# Patient Record
Sex: MALE | Race: WHITE | NOT HISPANIC OR LATINO | Employment: OTHER | URBAN - NONMETROPOLITAN AREA
[De-identification: names, ages, dates, MRNs, and addresses within clinical notes are randomized per-mention and may not be internally consistent; named-entity substitution may affect disease eponyms.]

---

## 2023-03-06 ENCOUNTER — TELEPHONE (OUTPATIENT)
Dept: PRIMARY CARE | Facility: CLINIC | Age: 73
End: 2023-03-06
Payer: COMMERCIAL

## 2023-03-06 DIAGNOSIS — J32.1 SINUSITIS CHRONIC, FRONTAL: ICD-10-CM

## 2023-03-06 RX ORDER — AZITHROMYCIN 500 MG/1
500 TABLET, FILM COATED ORAL DAILY
Qty: 5 TABLET | Refills: 0 | Status: SHIPPED | OUTPATIENT
Start: 2023-03-06 | End: 2023-03-11

## 2023-04-03 ENCOUNTER — TELEPHONE (OUTPATIENT)
Dept: PRIMARY CARE | Facility: CLINIC | Age: 73
End: 2023-04-03
Payer: COMMERCIAL

## 2023-04-03 DIAGNOSIS — U07.1 COVID-19: Primary | ICD-10-CM

## 2023-04-03 RX ORDER — AMOXICILLIN 875 MG/1
875 TABLET, FILM COATED ORAL 2 TIMES DAILY
Qty: 20 TABLET | Refills: 0 | Status: SHIPPED | OUTPATIENT
Start: 2023-04-03 | End: 2023-04-03 | Stop reason: SINTOL

## 2023-04-03 RX ORDER — PROPRANOLOL HYDROCHLORIDE 20 MG/1
1 TABLET ORAL 2 TIMES DAILY
COMMUNITY
Start: 2020-03-03 | End: 2023-08-23 | Stop reason: SDUPTHER

## 2023-04-03 RX ORDER — CALCIUM CARBONATE 500(1250)
1 TABLET ORAL DAILY
COMMUNITY
Start: 2020-03-03

## 2023-04-03 RX ORDER — AZITHROMYCIN 250 MG/1
TABLET, FILM COATED ORAL
Qty: 6 TABLET | Refills: 0 | Status: SHIPPED | OUTPATIENT
Start: 2023-04-03 | End: 2023-05-25 | Stop reason: ALTCHOICE

## 2023-04-03 RX ORDER — ACETAMINOPHEN 500 MG
50 TABLET ORAL DAILY
COMMUNITY
Start: 2020-03-03

## 2023-04-03 RX ORDER — ZINC GLUCONATE 50 MG
1 TABLET ORAL DAILY
COMMUNITY
Start: 2021-05-10 | End: 2023-12-15 | Stop reason: ALTCHOICE

## 2023-04-03 RX ORDER — PREDNISONE 20 MG/1
TABLET ORAL
Qty: 21 TABLET | Refills: 0 | Status: SHIPPED | OUTPATIENT
Start: 2023-04-03 | End: 2023-05-25 | Stop reason: ALTCHOICE

## 2023-04-03 RX ORDER — IBUPROFEN 100 MG/5ML
1 SUSPENSION, ORAL (FINAL DOSE FORM) ORAL DAILY
COMMUNITY
Start: 2020-03-03

## 2023-05-22 PROBLEM — N18.30 STAGE 3 CHRONIC KIDNEY DISEASE (MULTI): Status: ACTIVE | Noted: 2023-05-22

## 2023-05-22 PROBLEM — G25.2 INTENTION TREMOR: Status: ACTIVE | Noted: 2023-05-22

## 2023-05-22 PROBLEM — D63.8 ANEMIA OF CHRONIC DISEASE: Status: ACTIVE | Noted: 2023-05-22

## 2023-05-22 PROBLEM — R63.4 WEIGHT LOSS: Status: ACTIVE | Noted: 2023-05-22

## 2023-05-22 NOTE — PROGRESS NOTES
Subjective   Patient ID:   Kashif Jung is a 72 y.o. male who presents for Pre-op Exam.  HPI  Date of surgery: 6/13 and 6/20  Type of surgery: Cataracts bilateral  Surgeon performing: [facility]  Labs/tests: Nov 2022.  Previous stress test: 2019  Smoking status: Former smoker.  History of DVT/PE: No  Prior anesthesia: Tolerates well  Blood thinners: None  CP or SOB: Denies    Tinnitus/Insomnia:  Having troubles staying asleep at night.  Wondering if its due to his tinnitus.  Consider doing hearing tests.    Headache:  He hit his head on a shower bar about 4-5 weeks ago.  Having off and on headaches, but they aren't usually in the same spot.  Not happening daily.  Has migraine history.    Weight loss:  He did change his diet in Jan 2020 and cut out fast food.  Eating more salads now.  States a dietician said this can be normal due to his diet changes.  Last had colonoscopy in 2019.    Fatigue:  Has had this since COVID.  No recent fatigue workup.    Intention tremors:  Taking Propranolol.   These are stable.    CKD stage III:  Seeing nephrology.  Creatinine was 1.47 in Nov 2022    Anemia of chronic disease:  Last CBC was Nov 2022 and was stable.    Health maintenance:  Smoking: Former smoker.  PSA (50+): Nov 2022  Labs: Nov 2022  Colonoscopy (50-75): 2019.  Influenza:     Review of Systems  12 point review of systems negative unless stated above in HPI    Vitals:    05/25/23 1335   BP: 134/76   Pulse: 69   Resp: 18   SpO2: 93%       Physical Exam  General: Alert and oriented, well nourished, no acute distress.  Lungs: Clear to auscultation, non-labored respiration.  Heart: Normal rate, regular rhythm, no murmur, gallop or edema.  Neurologic: Awake, alert, and oriented X3, CN II-XII intact.  Psychiatric: Cooperative, appropriate mood and affect.    Assessment/Plan   Pt is medically cleared for surgery with low risk.  Paperwork filled out.  Good luck!  Consider doing hearing tests for the tinnitus.  Also  consider daily migraine medication/head imaging.  We can do fatigue labs next visit.  Continue the same medications.  Chronic conditions are stable.  Call with questions or concerns.    F/u  6 months  Diagnoses and all orders for this visit:  Anemia of chronic disease  Intention tremor  Stage 3 chronic kidney disease, unspecified whether stage 3a or 3b CKD (CMS/HCC)  Weight loss  Pre-op evaluation  Cataract of both eyes, unspecified cataract type  Tinnitus of both ears  Primary insomnia  Nonintractable episodic headache, unspecified headache type  Fatigue, unspecified type

## 2023-05-25 ENCOUNTER — OFFICE VISIT (OUTPATIENT)
Dept: PRIMARY CARE | Facility: CLINIC | Age: 73
End: 2023-05-25
Payer: MEDICARE

## 2023-05-25 VITALS
SYSTOLIC BLOOD PRESSURE: 134 MMHG | WEIGHT: 179.8 LBS | BODY MASS INDEX: 27.25 KG/M2 | RESPIRATION RATE: 18 BRPM | OXYGEN SATURATION: 93 % | HEIGHT: 68 IN | DIASTOLIC BLOOD PRESSURE: 76 MMHG | HEART RATE: 69 BPM

## 2023-05-25 DIAGNOSIS — D63.8 ANEMIA OF CHRONIC DISEASE: Primary | ICD-10-CM

## 2023-05-25 DIAGNOSIS — Z01.818 PRE-OP EVALUATION: ICD-10-CM

## 2023-05-25 DIAGNOSIS — G25.2 INTENTION TREMOR: ICD-10-CM

## 2023-05-25 DIAGNOSIS — R53.83 FATIGUE, UNSPECIFIED TYPE: ICD-10-CM

## 2023-05-25 DIAGNOSIS — N18.30 STAGE 3 CHRONIC KIDNEY DISEASE, UNSPECIFIED WHETHER STAGE 3A OR 3B CKD (MULTI): ICD-10-CM

## 2023-05-25 DIAGNOSIS — H26.9 CATARACT OF BOTH EYES, UNSPECIFIED CATARACT TYPE: ICD-10-CM

## 2023-05-25 DIAGNOSIS — R51.9 NONINTRACTABLE EPISODIC HEADACHE, UNSPECIFIED HEADACHE TYPE: ICD-10-CM

## 2023-05-25 DIAGNOSIS — F51.01 PRIMARY INSOMNIA: ICD-10-CM

## 2023-05-25 DIAGNOSIS — H93.13 TINNITUS OF BOTH EARS: ICD-10-CM

## 2023-05-25 DIAGNOSIS — R63.4 WEIGHT LOSS: ICD-10-CM

## 2023-05-25 PROBLEM — J30.9 ALLERGIC RHINITIS: Status: RESOLVED | Noted: 2023-05-25 | Resolved: 2023-05-25

## 2023-05-25 PROBLEM — M75.121 COMPLETE TEAR OF RIGHT ROTATOR CUFF: Status: RESOLVED | Noted: 2023-05-25 | Resolved: 2023-05-25

## 2023-05-25 PROBLEM — R79.89 ABNORMAL THYROID BLOOD TEST: Status: ACTIVE | Noted: 2023-05-25

## 2023-05-25 PROBLEM — H69.92 DYSFUNCTION OF LEFT EUSTACHIAN TUBE: Status: RESOLVED | Noted: 2023-05-25 | Resolved: 2023-05-25

## 2023-05-25 PROBLEM — G25.0 ESSENTIAL TREMOR: Status: ACTIVE | Noted: 2020-06-24

## 2023-05-25 PROBLEM — E78.00 ELEVATED LDL CHOLESTEROL LEVEL: Status: RESOLVED | Noted: 2023-05-25 | Resolved: 2023-05-25

## 2023-05-25 PROBLEM — N18.9 ANEMIA OF RENAL DISEASE: Status: ACTIVE | Noted: 2020-06-24

## 2023-05-25 PROBLEM — L29.0 PRURITUS ANI: Status: RESOLVED | Noted: 2023-05-25 | Resolved: 2023-05-25

## 2023-05-25 PROBLEM — R50.9 FEVER: Status: RESOLVED | Noted: 2023-05-25 | Resolved: 2023-05-25

## 2023-05-25 PROBLEM — I10 HYPERTENSION: Status: ACTIVE | Noted: 2020-06-24

## 2023-05-25 PROBLEM — M25.511 RIGHT SHOULDER PAIN: Status: RESOLVED | Noted: 2023-05-25 | Resolved: 2023-05-25

## 2023-05-25 PROBLEM — R05.9 COUGH: Status: RESOLVED | Noted: 2023-05-25 | Resolved: 2023-05-25

## 2023-05-25 PROBLEM — K63.5 SESSILE COLONIC POLYP: Status: ACTIVE | Noted: 2023-05-25

## 2023-05-25 PROBLEM — D63.1 ANEMIA OF RENAL DISEASE: Status: ACTIVE | Noted: 2020-06-24

## 2023-05-25 PROBLEM — J06.9 URI (UPPER RESPIRATORY INFECTION): Status: RESOLVED | Noted: 2023-05-25 | Resolved: 2023-05-25

## 2023-05-25 PROBLEM — F41.9 ANXIETY: Status: ACTIVE | Noted: 2023-05-25

## 2023-05-25 PROBLEM — K21.9 GASTROESOPHAGEAL REFLUX DISEASE: Status: RESOLVED | Noted: 2020-06-24 | Resolved: 2023-05-25

## 2023-05-25 PROBLEM — K21.9 GERD WITHOUT ESOPHAGITIS: Status: ACTIVE | Noted: 2023-05-25

## 2023-05-25 PROBLEM — N40.0 BENIGN PROSTATIC HYPERPLASIA WITHOUT LOWER URINARY TRACT SYMPTOMS: Status: ACTIVE | Noted: 2023-05-25

## 2023-05-25 PROBLEM — M75.41 IMPINGEMENT SYNDROME OF SHOULDER, RIGHT: Status: RESOLVED | Noted: 2023-05-25 | Resolved: 2023-05-25

## 2023-05-25 PROBLEM — D64.9 LOW HEMOGLOBIN: Status: ACTIVE | Noted: 2023-05-25

## 2023-05-25 PROBLEM — J20.9 ACUTE BRONCHITIS: Status: RESOLVED | Noted: 2023-05-25 | Resolved: 2023-05-25

## 2023-05-25 PROCEDURE — 3075F SYST BP GE 130 - 139MM HG: CPT | Performed by: PHYSICIAN ASSISTANT

## 2023-05-25 PROCEDURE — 1160F RVW MEDS BY RX/DR IN RCRD: CPT | Performed by: PHYSICIAN ASSISTANT

## 2023-05-25 PROCEDURE — 99214 OFFICE O/P EST MOD 30 MIN: CPT | Performed by: PHYSICIAN ASSISTANT

## 2023-05-25 PROCEDURE — 1159F MED LIST DOCD IN RCRD: CPT | Performed by: PHYSICIAN ASSISTANT

## 2023-05-25 PROCEDURE — 3078F DIAST BP <80 MM HG: CPT | Performed by: PHYSICIAN ASSISTANT

## 2023-05-25 PROCEDURE — 1036F TOBACCO NON-USER: CPT | Performed by: PHYSICIAN ASSISTANT

## 2023-05-25 ASSESSMENT — PATIENT HEALTH QUESTIONNAIRE - PHQ9
2. FEELING DOWN, DEPRESSED OR HOPELESS: NOT AT ALL
SUM OF ALL RESPONSES TO PHQ9 QUESTIONS 1 AND 2: 0
1. LITTLE INTEREST OR PLEASURE IN DOING THINGS: NOT AT ALL

## 2023-05-25 ASSESSMENT — PAIN SCALES - GENERAL: PAINLEVEL: 0-NO PAIN

## 2023-08-23 DIAGNOSIS — I10 HYPERTENSION, UNSPECIFIED TYPE: ICD-10-CM

## 2023-08-23 RX ORDER — PROPRANOLOL HYDROCHLORIDE 20 MG/1
20 TABLET ORAL 2 TIMES DAILY
Qty: 180 TABLET | Refills: 0 | Status: SHIPPED | OUTPATIENT
Start: 2023-08-23 | End: 2023-11-14

## 2023-10-17 ENCOUNTER — TELEPHONE (OUTPATIENT)
Dept: PRIMARY CARE | Facility: CLINIC | Age: 73
End: 2023-10-17
Payer: COMMERCIAL

## 2023-10-20 DIAGNOSIS — I10 HYPERTENSION, UNSPECIFIED TYPE: ICD-10-CM

## 2023-10-20 DIAGNOSIS — D63.8 ANEMIA OF CHRONIC DISEASE: Primary | ICD-10-CM

## 2023-10-20 DIAGNOSIS — Z00.00 HEALTH MAINTENANCE EXAMINATION: ICD-10-CM

## 2023-10-20 DIAGNOSIS — R79.89 ABNORMAL THYROID BLOOD TEST: ICD-10-CM

## 2023-10-20 DIAGNOSIS — N18.30 STAGE 3 CHRONIC KIDNEY DISEASE, UNSPECIFIED WHETHER STAGE 3A OR 3B CKD (MULTI): ICD-10-CM

## 2023-10-20 DIAGNOSIS — Z12.5 PROSTATE CANCER SCREENING: ICD-10-CM

## 2023-10-20 DIAGNOSIS — I10 ESSENTIAL (PRIMARY) HYPERTENSION: ICD-10-CM

## 2023-11-14 DIAGNOSIS — I10 HYPERTENSION, UNSPECIFIED TYPE: ICD-10-CM

## 2023-11-14 RX ORDER — PROPRANOLOL HYDROCHLORIDE 20 MG/1
20 TABLET ORAL 2 TIMES DAILY
Qty: 180 TABLET | Refills: 0 | Status: SHIPPED | OUTPATIENT
Start: 2023-11-14 | End: 2023-11-16 | Stop reason: SDUPTHER

## 2023-11-16 ENCOUNTER — OFFICE VISIT (OUTPATIENT)
Dept: PRIMARY CARE | Facility: CLINIC | Age: 73
End: 2023-11-16
Payer: MEDICARE

## 2023-11-16 VITALS
HEIGHT: 68 IN | DIASTOLIC BLOOD PRESSURE: 87 MMHG | WEIGHT: 187 LBS | BODY MASS INDEX: 28.34 KG/M2 | HEART RATE: 55 BPM | RESPIRATION RATE: 16 BRPM | OXYGEN SATURATION: 97 % | SYSTOLIC BLOOD PRESSURE: 143 MMHG

## 2023-11-16 DIAGNOSIS — Z00.00 MEDICARE ANNUAL WELLNESS VISIT, SUBSEQUENT: ICD-10-CM

## 2023-11-16 DIAGNOSIS — D63.8 ANEMIA OF CHRONIC DISEASE: ICD-10-CM

## 2023-11-16 DIAGNOSIS — G44.309 HEADACHES DUE TO OLD HEAD INJURY: ICD-10-CM

## 2023-11-16 DIAGNOSIS — S09.90XS HEADACHES DUE TO OLD HEAD INJURY: ICD-10-CM

## 2023-11-16 DIAGNOSIS — S09.90XS DIZZINESS DUE TO OLD HEAD TRAUMA: ICD-10-CM

## 2023-11-16 DIAGNOSIS — G25.0 ESSENTIAL TREMOR: ICD-10-CM

## 2023-11-16 DIAGNOSIS — R42 DIZZINESS DUE TO OLD HEAD TRAUMA: ICD-10-CM

## 2023-11-16 DIAGNOSIS — Z77.098 OCCUPATIONAL EXPOSURE TO CHEMICALS: ICD-10-CM

## 2023-11-16 DIAGNOSIS — R51.9 NONINTRACTABLE HEADACHE, UNSPECIFIED CHRONICITY PATTERN, UNSPECIFIED HEADACHE TYPE: Primary | ICD-10-CM

## 2023-11-16 DIAGNOSIS — N18.9 CHRONIC KIDNEY DISEASE, UNSPECIFIED CKD STAGE: ICD-10-CM

## 2023-11-16 DIAGNOSIS — I10 HYPERTENSION, UNSPECIFIED TYPE: ICD-10-CM

## 2023-11-16 PROBLEM — S69.90XA INJURY OF FINGER: Status: ACTIVE | Noted: 2023-11-16

## 2023-11-16 PROBLEM — S22.39XA CLOSED FRACTURE OF ONE RIB: Status: ACTIVE | Noted: 2023-11-16
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
## 2023-11-16 PROCEDURE — G0439 PPPS, SUBSEQ VISIT: HCPCS | Performed by: INTERNAL MEDICINE

## 2023-11-16 PROCEDURE — 1160F RVW MEDS BY RX/DR IN RCRD: CPT | Performed by: INTERNAL MEDICINE

## 2023-11-16 PROCEDURE — 3077F SYST BP >= 140 MM HG: CPT | Performed by: INTERNAL MEDICINE

## 2023-11-16 PROCEDURE — 3079F DIAST BP 80-89 MM HG: CPT | Performed by: INTERNAL MEDICINE

## 2023-11-16 PROCEDURE — 1125F AMNT PAIN NOTED PAIN PRSNT: CPT | Performed by: INTERNAL MEDICINE

## 2023-11-16 PROCEDURE — 99214 OFFICE O/P EST MOD 30 MIN: CPT | Performed by: INTERNAL MEDICINE

## 2023-11-16 PROCEDURE — 1170F FXNL STATUS ASSESSED: CPT | Performed by: INTERNAL MEDICINE

## 2023-11-16 PROCEDURE — 1159F MED LIST DOCD IN RCRD: CPT | Performed by: INTERNAL MEDICINE

## 2023-11-16 PROCEDURE — 1036F TOBACCO NON-USER: CPT | Performed by: INTERNAL MEDICINE

## 2023-11-16 RX ORDER — PROPRANOLOL HYDROCHLORIDE 20 MG/1
20 TABLET ORAL 2 TIMES DAILY
Qty: 180 TABLET | Refills: 0 | Status: SHIPPED | OUTPATIENT
Start: 2023-11-16 | End: 2024-03-19 | Stop reason: SDUPTHER

## 2023-11-16 ASSESSMENT — PATIENT HEALTH QUESTIONNAIRE - PHQ9
2. FEELING DOWN, DEPRESSED OR HOPELESS: NOT AT ALL
1. LITTLE INTEREST OR PLEASURE IN DOING THINGS: NOT AT ALL
SUM OF ALL RESPONSES TO PHQ9 QUESTIONS 1 AND 2: 0

## 2023-11-16 ASSESSMENT — ACTIVITIES OF DAILY LIVING (ADL)
DRESSING: INDEPENDENT
MANAGING_FINANCES: INDEPENDENT
BATHING: INDEPENDENT
DOING_HOUSEWORK: INDEPENDENT
TAKING_MEDICATION: INDEPENDENT
GROCERY_SHOPPING: INDEPENDENT

## 2023-11-16 ASSESSMENT — PAIN SCALES - GENERAL: PAINLEVEL: 2

## 2023-11-16 NOTE — PROGRESS NOTES
Patient ID: He states that months ago, he hit his head on towel rack in shower, has been having headaches and neck pain since then. He did not go to ER at that time.  He states the neck pain is improving with going to chiropractor. He states that his headaches have been bothering him over past couple months, pain is in different spots every time. He does have migraines, triggered by bright lights, for which he takes OTC Tylenol with effect within 20 minutes. He denies any blurred vision. He states that he was very active for two years, lost 40 pounds at that time. He states that he is now gaining weight again as he is not as active anymore.     HPI: Kashif Jung is a 73 y.o. male with PMH remarkable for CKD, anemia, who presents to the office today for Medicare Physical.    HEALTH MAINTENANCE: Annual Wellness Physical  Smoking: Former smokerm, quit > 30 years  PSA (50+): 2022, DUE  Labs: 2022, DUE  Colonoscopy (50-75): 2019  Influenza:     SOCIAL HISTORY:  Social History     Tobacco Use    Smoking status: Former     Types: Cigarettes     Quit date:      Years since quittin.8    Smokeless tobacco: Never   Substance Use Topics    Alcohol use: Yes     Comment: rare    Drug use: Never     IMMUNIZATIONS:  Immunization History   Administered Date(s) Administered    Moderna SARS-CoV-2 Vaccination 02/10/2021, 2021, 11/10/2021, 2022    Zoster vaccine, recombinant, adult (SHINGRIX) 2023, 2023     REVIEW OF SYSTEMS:  Review of Systems   Constitutional: Negative.    Respiratory: Negative.     Cardiovascular: Negative.    Genitourinary: Negative.    Musculoskeletal:  Positive for neck pain.   Skin: Negative.    Neurological:  Positive for headaches.   Psychiatric/Behavioral: Negative.       ALLERGIES:  Allergies   Allergen Reactions    Codeine Unknown    Penicillins Rash      VITAL SIGNS:  Vitals:    23 1014   BP: 143/87   Pulse: 55   Resp: 16   SpO2: 97%   Weight: 84.8 kg (187  "lb)   Height: 1.727 m (5' 8\")     Physical Exam  Vitals reviewed.   Constitutional:       Appearance: Normal appearance.   HENT:      Head: Normocephalic and atraumatic.   Cardiovascular:      Rate and Rhythm: Normal rate and regular rhythm.      Pulses: Normal pulses.      Heart sounds: Normal heart sounds.   Pulmonary:      Effort: Pulmonary effort is normal.      Breath sounds: Normal breath sounds.   Abdominal:      General: Bowel sounds are normal.      Palpations: Abdomen is soft.   Musculoskeletal:         General: Normal range of motion.   Skin:     General: Skin is warm and dry.   Neurological:      General: No focal deficit present.      Mental Status: He is alert and oriented to person, place, and time.   Psychiatric:         Mood and Affect: Mood normal.         Behavior: Behavior normal.     MEDICATIONS:  Current Outpatient Medications on File Prior to Visit   Medication Sig Dispense Refill    ascorbic acid (Vitamin C) 1,000 mg tablet Take 1 tablet (1,000 mg) by mouth once daily.      calcium 500 mg calcium (1,250 mg) tablet Take 1 tablet (500 mg) by mouth once daily.      cholecalciferol (Vitamin D-3) 50 mcg (2,000 unit) capsule Take 1 capsule (50 mcg) by mouth early in the morning..      propranolol (Inderal) 20 mg tablet TAKE 1 TABLET TWICE A  tablet 0    zinc gluconate 50 mg tablet Take 1 tablet (50 mg) by mouth once daily.      [DISCONTINUED] propranolol (Inderal) 20 mg tablet Take 1 tablet (20 mg) by mouth 2 times a day. 180 tablet 0     No current facility-administered medications on file prior to visit.      LABORATORY DATA:  Lab Results   Component Value Date    WBC 6.4 11/16/2022    HGB 12.9 (L) 11/16/2022    HCT 39.0 (L) 11/16/2022     11/16/2022    CHOL 188 11/16/2022    TRIG 45 11/16/2022    HDL 57.0 11/16/2022    ALT 9 (L) 11/16/2022    AST 15 11/16/2022     11/16/2022    K 4.1 11/16/2022     11/16/2022    CREATININE 1.47 (H) 11/16/2022    BUN 29 (H) 11/16/2022    " CO2 32 2022    TSH 3.49 2021     ASSESSMENT AND PLAN:  Healthcare Maintenance: Medicare Wellness exam  - he is due for annual fasting bloodwork, orders were previously inputted  - he had colonoscopy in 2019  - dexa scan in 2020 revealed osteopenia  - 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, 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, and his father  from [place] 2/2 chemicals  - will check CT chest due to chemical exposure  - follow up in six months    Headache and neck pain after recent trauma  - He hit his head on a shower bar about 5 months ago, hit it hard enough to knock bar off wall, did not seek medical attention at that time  - states he had neck pain and headaches since then, neck pain improving with visits to chiropractor  - headaches are on and off in different spots over past several months  - he admits to history of migraines, triggered by bright lights, takes Tylenol for this and it is effective  - no neurological deficits noted on exam  - will get US of neck to check carotid arteries  - get CTH for further evaluation     Intention tremors  - stable  - continue with Propanolol     CKD stage III  - reviewed bloodwork with patient from , trended BUN and creatnine  - US of kidneys was normal, no uropathy  - he saw Dr. Mcgee twice, and was told to keep doing what he is doing as far as diet, controlling BP  - will check urine-->microscopic urinalysis and for albumin  - check CMP     Anemia of chronic disease  - continue to monitor CBC which has been stable     --------------------  Written by Mabel Lerma LPN, acting as a scribe for Dr. Jackson. This note accurately reflects the work and decisions made by Dr. Jackson.     I, Dr. Jackson, attest all medical record entries made by the scribe were under my direction and were personally dictated by me. I have reviewed the chart and agree that the record accurately reflects my performance of the history, physical exam, and assessment and plan.

## 2023-11-17 ENCOUNTER — LAB (OUTPATIENT)
Dept: LAB | Facility: LAB | Age: 73
End: 2023-11-17
Payer: MEDICARE

## 2023-11-17 DIAGNOSIS — Z12.5 PROSTATE CANCER SCREENING: ICD-10-CM

## 2023-11-17 DIAGNOSIS — N18.30 STAGE 3 CHRONIC KIDNEY DISEASE, UNSPECIFIED WHETHER STAGE 3A OR 3B CKD (MULTI): ICD-10-CM

## 2023-11-17 DIAGNOSIS — R79.89 ABNORMAL THYROID BLOOD TEST: ICD-10-CM

## 2023-11-17 DIAGNOSIS — I10 HYPERTENSION, UNSPECIFIED TYPE: ICD-10-CM

## 2023-11-17 DIAGNOSIS — N18.9 CHRONIC KIDNEY DISEASE, UNSPECIFIED CKD STAGE: ICD-10-CM

## 2023-11-17 DIAGNOSIS — D63.8 ANEMIA OF CHRONIC DISEASE: ICD-10-CM

## 2023-11-17 DIAGNOSIS — Z00.00 HEALTH MAINTENANCE EXAMINATION: ICD-10-CM

## 2023-11-17 DIAGNOSIS — I10 ESSENTIAL (PRIMARY) HYPERTENSION: ICD-10-CM

## 2023-11-17 LAB
ALBUMIN SERPL BCP-MCNC: 4.4 G/DL (ref 3.4–5)
ALP SERPL-CCNC: 47 U/L (ref 33–136)
ALT SERPL W P-5'-P-CCNC: 11 U/L (ref 10–52)
ANION GAP SERPL CALC-SCNC: 10 MMOL/L (ref 10–20)
AST SERPL W P-5'-P-CCNC: 14 U/L (ref 9–39)
BASOPHILS # BLD AUTO: 0.04 X10*3/UL (ref 0–0.1)
BASOPHILS NFR BLD AUTO: 0.7 %
BILIRUB SERPL-MCNC: 0.6 MG/DL (ref 0–1.2)
BUN SERPL-MCNC: 32 MG/DL (ref 6–23)
CALCIUM SERPL-MCNC: 9.5 MG/DL (ref 8.6–10.3)
CHLORIDE SERPL-SCNC: 105 MMOL/L (ref 98–107)
CHOLEST SERPL-MCNC: 194 MG/DL (ref 0–199)
CHOLESTEROL/HDL RATIO: 3.4
CO2 SERPL-SCNC: 30 MMOL/L (ref 21–32)
CREAT SERPL-MCNC: 1.46 MG/DL (ref 0.5–1.3)
EOSINOPHIL # BLD AUTO: 0.21 X10*3/UL (ref 0–0.4)
EOSINOPHIL NFR BLD AUTO: 3.9 %
ERYTHROCYTE [DISTWIDTH] IN BLOOD BY AUTOMATED COUNT: 13.3 % (ref 11.5–14.5)
GFR SERPL CREATININE-BSD FRML MDRD: 51 ML/MIN/1.73M*2
GLUCOSE SERPL-MCNC: 88 MG/DL (ref 74–99)
HCT VFR BLD AUTO: 40.8 % (ref 41–52)
HDLC SERPL-MCNC: 57 MG/DL
HGB BLD-MCNC: 13.4 G/DL (ref 13.5–17.5)
IMM GRANULOCYTES # BLD AUTO: 0.01 X10*3/UL (ref 0–0.5)
IMM GRANULOCYTES NFR BLD AUTO: 0.2 % (ref 0–0.9)
LDLC SERPL CALC-MCNC: 122 MG/DL
LYMPHOCYTES # BLD AUTO: 1.82 X10*3/UL (ref 0.8–3)
LYMPHOCYTES NFR BLD AUTO: 34 %
MCH RBC QN AUTO: 32 PG (ref 26–34)
MCHC RBC AUTO-ENTMCNC: 32.8 G/DL (ref 32–36)
MCV RBC AUTO: 97 FL (ref 80–100)
MONOCYTES # BLD AUTO: 0.6 X10*3/UL (ref 0.05–0.8)
MONOCYTES NFR BLD AUTO: 11.2 %
NEUTROPHILS # BLD AUTO: 2.68 X10*3/UL (ref 1.6–5.5)
NEUTROPHILS NFR BLD AUTO: 50 %
NON HDL CHOLESTEROL: 137 MG/DL (ref 0–149)
NRBC BLD-RTO: 0 /100 WBCS (ref 0–0)
PLATELET # BLD AUTO: 247 X10*3/UL (ref 150–450)
POTASSIUM SERPL-SCNC: 3.9 MMOL/L (ref 3.5–5.3)
PROT SERPL-MCNC: 7.3 G/DL (ref 6.4–8.2)
RBC # BLD AUTO: 4.19 X10*6/UL (ref 4.5–5.9)
RBC #/AREA URNS AUTO: NORMAL /HPF
SODIUM SERPL-SCNC: 141 MMOL/L (ref 136–145)
TRIGL SERPL-MCNC: 75 MG/DL (ref 0–149)
TSH SERPL-ACNC: 2.95 MIU/L (ref 0.44–3.98)
VLDL: 15 MG/DL (ref 0–40)
WBC # BLD AUTO: 5.4 X10*3/UL (ref 4.4–11.3)
WBC #/AREA URNS AUTO: NORMAL /HPF

## 2023-11-17 PROCEDURE — 80061 LIPID PANEL: CPT

## 2023-11-17 PROCEDURE — 84443 ASSAY THYROID STIM HORMONE: CPT

## 2023-11-17 PROCEDURE — 36415 COLL VENOUS BLD VENIPUNCTURE: CPT

## 2023-11-17 PROCEDURE — 82043 UR ALBUMIN QUANTITATIVE: CPT

## 2023-11-17 PROCEDURE — 80053 COMPREHEN METABOLIC PANEL: CPT

## 2023-11-17 PROCEDURE — 85025 COMPLETE CBC W/AUTO DIFF WBC: CPT

## 2023-11-17 PROCEDURE — G0103 PSA SCREENING: HCPCS

## 2023-11-17 PROCEDURE — 81001 URINALYSIS AUTO W/SCOPE: CPT

## 2023-11-17 PROCEDURE — 82570 ASSAY OF URINE CREATININE: CPT

## 2023-11-17 ASSESSMENT — ENCOUNTER SYMPTOMS
CONSTITUTIONAL NEGATIVE: 1
NECK PAIN: 1
PSYCHIATRIC NEGATIVE: 1
CARDIOVASCULAR NEGATIVE: 1
HEADACHES: 1
RESPIRATORY NEGATIVE: 1

## 2023-11-17 NOTE — PATIENT INSTRUCTIONS
It was nice to see you today for your annual Medicare Wellness visit.  As discussed during our visit today ...    Please continue to take your current medications   Please get bloodwork drawn as soon as you are able. We will call you with results.    We will get imaging for further evaluation of your neck and head pain, including CT scan of head and ultrasound of neck, schedule when able/ we will call you with results  WE will check CT scan of your chest due to chemical exposure and we will call you with results  Follow up in four months

## 2023-11-18 LAB
CREAT UR-MCNC: 86.6 MG/DL (ref 20–370)
MICROALBUMIN UR-MCNC: <7 MG/L
MICROALBUMIN/CREAT UR: NORMAL MG/G{CREAT}
PSA SERPL-MCNC: 0.62 NG/ML

## 2023-12-01 ENCOUNTER — HOSPITAL ENCOUNTER (OUTPATIENT)
Dept: RADIOLOGY | Facility: HOSPITAL | Age: 73
Discharge: HOME | End: 2023-12-01
Payer: MEDICARE

## 2023-12-01 ENCOUNTER — APPOINTMENT (OUTPATIENT)
Dept: RADIOLOGY | Facility: HOSPITAL | Age: 73
End: 2023-12-01
Payer: MEDICARE

## 2023-12-01 DIAGNOSIS — R09.89 OTHER SPECIFIED SYMPTOMS AND SIGNS INVOLVING THE CIRCULATORY AND RESPIRATORY SYSTEMS: ICD-10-CM

## 2023-12-01 DIAGNOSIS — S09.90XS HEADACHES DUE TO OLD HEAD INJURY: ICD-10-CM

## 2023-12-01 DIAGNOSIS — G44.309 HEADACHES DUE TO OLD HEAD INJURY: ICD-10-CM

## 2023-12-01 DIAGNOSIS — S09.90XS DIZZINESS DUE TO OLD HEAD TRAUMA: ICD-10-CM

## 2023-12-01 DIAGNOSIS — Z77.098 OCCUPATIONAL EXPOSURE TO CHEMICALS: ICD-10-CM

## 2023-12-01 DIAGNOSIS — R42 DIZZINESS DUE TO OLD HEAD TRAUMA: ICD-10-CM

## 2023-12-01 PROCEDURE — 93880 EXTRACRANIAL BILAT STUDY: CPT

## 2023-12-01 PROCEDURE — 71250 CT THORAX DX C-: CPT | Performed by: RADIOLOGY

## 2023-12-01 PROCEDURE — 70450 CT HEAD/BRAIN W/O DYE: CPT | Performed by: RADIOLOGY

## 2023-12-01 PROCEDURE — 71250 CT THORAX DX C-: CPT

## 2023-12-01 PROCEDURE — 70450 CT HEAD/BRAIN W/O DYE: CPT

## 2023-12-01 PROCEDURE — 93880 EXTRACRANIAL BILAT STUDY: CPT | Performed by: INTERNAL MEDICINE

## 2023-12-04 DIAGNOSIS — R93.0 ABNORMAL CT OF THE HEAD: ICD-10-CM

## 2023-12-14 ENCOUNTER — OFFICE VISIT (OUTPATIENT)
Dept: NEUROSURGERY | Facility: CLINIC | Age: 73
End: 2023-12-14
Payer: MEDICARE

## 2023-12-14 VITALS
BODY MASS INDEX: 27.4 KG/M2 | RESPIRATION RATE: 16 BRPM | TEMPERATURE: 96.4 F | DIASTOLIC BLOOD PRESSURE: 74 MMHG | HEIGHT: 69 IN | SYSTOLIC BLOOD PRESSURE: 124 MMHG | HEART RATE: 62 BPM | WEIGHT: 185 LBS

## 2023-12-14 DIAGNOSIS — R93.0 ABNORMAL CT OF THE HEAD: ICD-10-CM

## 2023-12-14 DIAGNOSIS — R51.9 NONINTRACTABLE EPISODIC HEADACHE, UNSPECIFIED HEADACHE TYPE: Primary | ICD-10-CM

## 2023-12-14 PROCEDURE — 1036F TOBACCO NON-USER: CPT | Performed by: NURSE PRACTITIONER

## 2023-12-14 PROCEDURE — 1159F MED LIST DOCD IN RCRD: CPT | Performed by: NURSE PRACTITIONER

## 2023-12-14 PROCEDURE — 1160F RVW MEDS BY RX/DR IN RCRD: CPT | Performed by: NURSE PRACTITIONER

## 2023-12-14 PROCEDURE — 3074F SYST BP LT 130 MM HG: CPT | Performed by: NURSE PRACTITIONER

## 2023-12-14 PROCEDURE — 99202 OFFICE O/P NEW SF 15 MIN: CPT | Performed by: NURSE PRACTITIONER

## 2023-12-14 PROCEDURE — 1125F AMNT PAIN NOTED PAIN PRSNT: CPT | Performed by: NURSE PRACTITIONER

## 2023-12-14 PROCEDURE — 99212 OFFICE O/P EST SF 10 MIN: CPT | Performed by: NURSE PRACTITIONER

## 2023-12-14 PROCEDURE — 3078F DIAST BP <80 MM HG: CPT | Performed by: NURSE PRACTITIONER

## 2023-12-14 RX ORDER — ACETAMINOPHEN 500 MG
500 TABLET ORAL EVERY 6 HOURS PRN
COMMUNITY

## 2023-12-14 ASSESSMENT — PAIN SCALES - GENERAL: PAINLEVEL: 2

## 2023-12-14 NOTE — PROGRESS NOTES
"Chief Complaint:   NPV      History Of Present Illness:    Kashif Coy is a 73-year-old male with a PMH significant for tremors, CKD stage III, anemia of chornic disease, who recently underwent CT of his head for evaluation after he hit his head on his shower bar approximately 5 months ago. He did not seek medical attention at that time. Patient does have neck pain with associated headaches. . Has a history of spina bifida occulta with chronic low back pain and has been working with a chiropractor for adjustments of his back. Has been doing neck adjustments which helps his neck pain. CTH without acute abnormality but there was a cortical hypreattenuation within the anterior frontal lobe that was most likely artifact. Patient presents to clinic for NPV.     No acute vision changes. Has a history of migraine HA with aura for over 20 years. Headaches varied - back of neck / top of his head.     Vital Signs   /74   Pulse 62   Temp 35.8 °C (96.4 °F)   Resp 16   Ht 1.753 m (5' 9\")   Wt 83.9 kg (185 lb)   BMI 27.32 kg/m²          Physical Exam:  A&Ox3   Fluent speech   EOMI; PERRLA   GANDHI; strength 5/5; no drift   Face and shoulder shrug symmetrical   Gait normal         Past Medical History:  Past Medical History:   Diagnosis Date    Acute bronchitis 05/25/2023    Complete tear of right rotator cuff 05/25/2023    Cough 05/25/2023    Dysfunction of left eustachian tube 05/25/2023    Elevated LDL cholesterol level 05/25/2023    Fatigue 05/25/2023    Fever 05/25/2023    Other conditions influencing health status     No significant past medical history    Pruritus ani 05/25/2023    Right shoulder pain 05/25/2023    Tinnitus, bilateral 05/25/2023    URI (upper respiratory infection) 05/25/2023       Past Surgical History:   Past Surgical History:   Procedure Laterality Date    SHOULDER SURGERY  09/10/2014    Shoulder Surgery       Outpatient Medications:  Current Outpatient Medications   Medication Instructions    " acetaminophen (TYLENOL) 500 mg, oral, Every 6 hours PRN    ascorbic acid (Vitamin C) 1,000 mg tablet 1 tablet, oral, Daily    calcium 500 mg calcium (1,250 mg) tablet 1 tablet, oral, Daily    cholecalciferol (VITAMIN D-3) 50 mcg, oral, Daily    propranolol (INDERAL) 20 mg, oral, 2 times daily    zinc gluconate 50 mg tablet 1 tablet, oral, Daily         Relevant Results:   Imaging Results: CT head wo IV contrast 12/01/2023    Narrative  Interpreted By:  Ag Roy and Ravi Shweta  STUDY:  CT HEAD WO IV CONTRAST;  12/1/2023 7:50 am    INDICATION:  Signs/Symptoms:headache.    COMPARISON:  None.    ACCESSION NUMBER(S):  [id number]    ORDERING CLINICIAN:  DARRELL JOE    TECHNIQUE:  Noncontrast axial CT scan of head was performed. Angled reformats in  brain and bone windows were generated. The images were reviewed in  bone, brain, blood and soft tissue windows. Coronal and sagittal  reformats were obtained.    FINDINGS:  No focal hypoattenuation suggestive of an acute infarct, extra-axial  collection. There is focal curvilinear cortical hyperattenuation  involving small border area of left frontal lobe as on image 18 of  33. Multiple additional areas of less conspicuous cortical  hypoattenuation are noted, as on coronal image 26 of 113 on the left  and the right margins of frontal lobes; all than most suggestive of  beam artifact.    The ventricles, sulci, and basal cisterns are normal in size for age.  Minimal nonspecific periventricular and subcortical white-matter  hypoattenuation is noted, favoring small vessel ischemic changes.  Atheromatous changes of the carotid siphons. The cerebellar tonsils  terminate at the level of the foramen magnum.    Gray-white differentiation is intact. There is no midline shift.    The calvarium is unremarkable. No focal scalp hematoma.    The visualized paranasal sinuses and mastoid air cells are  unremarkable.    The visualized orbits are unremarkable without evidence  of  retrobulbar hematoma.    Impression  1. Nonspecific gyriform region of cortical hyperattenuation within  the anterior frontal lobes, most likely artifact.  2. Mild burden patchy white matter hypodensities likely of chronic  small vessel ischemic origin.  3. Mild prominence CSF spaces which may be age related diffuse  cerebral volume loss.  4. No acute intracranial abnormality.    I personally reviewed the images/study and I agree with the resident  findings as stated by Carly Arreaga MD. This study was interpreted at  [facility], [place], [place].    MACRO:  None    Signed by: Ag Roy 12/1/2023 10:51 AM  Dictation workstation:   [id number]      Assessment/Plan   The primary encounter diagnosis was Nonintractable episodic headache, unspecified headache type. A diagnosis of Abnormal CT of the head was also pertinent to this visit.  Patient is a 73-year-old male with a PMH significant for tremors, CKD stage III, anemia of chornic disease, who recently underwent CT of his head for evaluation after he hit his head on his shower bar approximately 5 months ago. CTH without acute abnormality but there was a cortical hypreattenuation within the anterior frontal lobe that was most likely artifact. Will plan for repeat CTH to reassess frontal lobe hyperattenuation. While likely to be artifact will rule out to be sure due to persistent headaches. Once imaging is completed will call patient with results. Plan discussed with patient and wife who were in agreement. All questions answered.

## 2023-12-16 NOTE — PATIENT INSTRUCTIONS
Welcome to [facility]arc[facility], CNP clinic. We are here to assist you through your Neurological Surgery care at [facility]. Dr. Serrano's office number is [phone]. This number is the most direct way to communicate with the office. The office fax number is [phone].    A CT Head w/o contrast was ordered during your visit today. Please call [phone] to assist you in scheduling your imaging.

## 2023-12-21 ENCOUNTER — APPOINTMENT (OUTPATIENT)
Dept: RADIOLOGY | Facility: HOSPITAL | Age: 73
End: 2023-12-21
Payer: MEDICARE

## 2024-01-02 ENCOUNTER — HOSPITAL ENCOUNTER (OUTPATIENT)
Dept: RADIOLOGY | Facility: HOSPITAL | Age: 74
Discharge: HOME | End: 2024-01-02
Payer: MEDICARE

## 2024-01-02 DIAGNOSIS — R93.0 ABNORMAL CT OF THE HEAD: ICD-10-CM

## 2024-01-02 PROCEDURE — 70450 CT HEAD/BRAIN W/O DYE: CPT | Performed by: RADIOLOGY

## 2024-01-02 PROCEDURE — 70450 CT HEAD/BRAIN W/O DYE: CPT

## 2024-01-03 ENCOUNTER — LAB (OUTPATIENT)
Dept: LAB | Facility: LAB | Age: 74
End: 2024-01-03
Payer: MEDICARE

## 2024-01-03 DIAGNOSIS — G25.0 ESSENTIAL TREMOR: ICD-10-CM

## 2024-01-03 DIAGNOSIS — D63.1 ANEMIA IN CHRONIC KIDNEY DISEASE (CODE): ICD-10-CM

## 2024-01-03 DIAGNOSIS — N18.9 CHRONIC KIDNEY DISEASE, UNSPECIFIED: ICD-10-CM

## 2024-01-03 DIAGNOSIS — N18.30 CHRONIC KIDNEY DISEASE, STAGE 3 UNSPECIFIED (MULTI): ICD-10-CM

## 2024-01-03 DIAGNOSIS — I10 ESSENTIAL (PRIMARY) HYPERTENSION: ICD-10-CM

## 2024-01-03 DIAGNOSIS — E55.9 VITAMIN D DEFICIENCY, UNSPECIFIED: Primary | ICD-10-CM

## 2024-01-03 LAB
25(OH)D3 SERPL-MCNC: 65 NG/ML (ref 30–100)
ALBUMIN SERPL BCP-MCNC: 4.3 G/DL (ref 3.4–5)
ANION GAP SERPL CALC-SCNC: 12 MMOL/L (ref 10–20)
APPEARANCE UR: CLEAR
BILIRUB UR STRIP.AUTO-MCNC: NEGATIVE MG/DL
BUN SERPL-MCNC: 30 MG/DL (ref 6–23)
CALCIUM SERPL-MCNC: 9.6 MG/DL (ref 8.6–10.3)
CHLORIDE SERPL-SCNC: 104 MMOL/L (ref 98–107)
CO2 SERPL-SCNC: 29 MMOL/L (ref 21–32)
COLOR UR: NORMAL
CREAT SERPL-MCNC: 1.5 MG/DL (ref 0.5–1.3)
ERYTHROCYTE [DISTWIDTH] IN BLOOD BY AUTOMATED COUNT: 13.2 % (ref 11.5–14.5)
GFR SERPL CREATININE-BSD FRML MDRD: 49 ML/MIN/1.73M*2
GLUCOSE SERPL-MCNC: 90 MG/DL (ref 74–99)
GLUCOSE UR STRIP.AUTO-MCNC: NEGATIVE MG/DL
HCT VFR BLD AUTO: 38.4 % (ref 41–52)
HGB BLD-MCNC: 12.6 G/DL (ref 13.5–17.5)
KETONES UR STRIP.AUTO-MCNC: NEGATIVE MG/DL
LEUKOCYTE ESTERASE UR QL STRIP.AUTO: NEGATIVE
MCH RBC QN AUTO: 32.2 PG (ref 26–34)
MCHC RBC AUTO-ENTMCNC: 32.8 G/DL (ref 32–36)
MCV RBC AUTO: 98 FL (ref 80–100)
NITRITE UR QL STRIP.AUTO: NEGATIVE
NRBC BLD-RTO: 0 /100 WBCS (ref 0–0)
PH UR STRIP.AUTO: 5 [PH]
PHOSPHATE SERPL-MCNC: 3.9 MG/DL (ref 2.5–4.9)
PLATELET # BLD AUTO: 254 X10*3/UL (ref 150–450)
POTASSIUM SERPL-SCNC: 4.3 MMOL/L (ref 3.5–5.3)
PROT UR STRIP.AUTO-MCNC: NEGATIVE MG/DL
RBC # BLD AUTO: 3.91 X10*6/UL (ref 4.5–5.9)
RBC # UR STRIP.AUTO: NEGATIVE /UL
SODIUM SERPL-SCNC: 141 MMOL/L (ref 136–145)
SP GR UR STRIP.AUTO: 1.01
UROBILINOGEN UR STRIP.AUTO-MCNC: <2 MG/DL
WBC # BLD AUTO: 4.5 X10*3/UL (ref 4.4–11.3)

## 2024-01-03 PROCEDURE — 84166 PROTEIN E-PHORESIS/URINE/CSF: CPT

## 2024-01-03 PROCEDURE — 81003 URINALYSIS AUTO W/O SCOPE: CPT

## 2024-01-03 PROCEDURE — 82570 ASSAY OF URINE CREATININE: CPT

## 2024-01-03 PROCEDURE — 82306 VITAMIN D 25 HYDROXY: CPT

## 2024-01-03 PROCEDURE — 84166 PROTEIN E-PHORESIS/URINE/CSF: CPT | Performed by: INTERNAL MEDICINE

## 2024-01-03 PROCEDURE — 84156 ASSAY OF PROTEIN URINE: CPT

## 2024-01-03 PROCEDURE — 80069 RENAL FUNCTION PANEL: CPT

## 2024-01-03 PROCEDURE — 83970 ASSAY OF PARATHORMONE: CPT

## 2024-01-03 PROCEDURE — 36415 COLL VENOUS BLD VENIPUNCTURE: CPT

## 2024-01-03 PROCEDURE — 85027 COMPLETE CBC AUTOMATED: CPT

## 2024-01-04 LAB
CREAT UR-MCNC: 80.2 MG/DL (ref 20–370)
PROT UR-ACNC: 5 MG/DL (ref 5–25)
PROT UR-ACNC: 5 MG/DL (ref 5–25)
PROT/CREAT UR: 0.06 MG/MG CREAT (ref 0–0.17)
PTH-INTACT SERPL-MCNC: 27.5 PG/ML (ref 18.5–88)

## 2024-01-07 LAB
ALBUMIN MFR UR ELPH: 33.4 %
ALPHA1 GLOB MFR UR ELPH: 10.5 %
ALPHA2 GLOB MFR UR ELPH: 18.4 %
B-GLOBULIN MFR UR ELPH: 21.7 %
GAMMA GLOB MFR UR ELPH: 16 %
PATH REVIEW-URINE PROTEIN ELECTROPHORESIS: NORMAL
URINE ELECTROPHORESIS COMMENT: NORMAL

## 2024-03-19 ENCOUNTER — OFFICE VISIT (OUTPATIENT)
Dept: PRIMARY CARE | Facility: CLINIC | Age: 74
End: 2024-03-19
Payer: MEDICARE

## 2024-03-19 VITALS
DIASTOLIC BLOOD PRESSURE: 82 MMHG | WEIGHT: 190 LBS | HEIGHT: 69 IN | SYSTOLIC BLOOD PRESSURE: 127 MMHG | HEART RATE: 57 BPM | RESPIRATION RATE: 18 BRPM | BODY MASS INDEX: 28.14 KG/M2 | OXYGEN SATURATION: 97 %

## 2024-03-19 DIAGNOSIS — G25.2 INTENTION TREMOR: ICD-10-CM

## 2024-03-19 DIAGNOSIS — Z00.00 HEALTHCARE MAINTENANCE: ICD-10-CM

## 2024-03-19 DIAGNOSIS — N18.9 CHRONIC KIDNEY DISEASE, UNSPECIFIED CKD STAGE: ICD-10-CM

## 2024-03-19 DIAGNOSIS — I10 HYPERTENSION, UNSPECIFIED TYPE: ICD-10-CM

## 2024-03-19 DIAGNOSIS — D63.8 ANEMIA OF CHRONIC DISEASE: ICD-10-CM

## 2024-03-19 DIAGNOSIS — N18.31 STAGE 3A CHRONIC KIDNEY DISEASE (MULTI): ICD-10-CM

## 2024-03-19 DIAGNOSIS — M85.852 OSTEOPENIA OF NECK OF LEFT FEMUR: ICD-10-CM

## 2024-03-19 PROBLEM — R79.89 ABNORMAL THYROID BLOOD TEST: Status: RESOLVED | Noted: 2023-05-25 | Resolved: 2024-03-19
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
## 2024-03-19 PROBLEM — D63.1 ANEMIA OF RENAL DISEASE: Status: RESOLVED | Noted: 2020-06-24 | Resolved: 2024-03-19

## 2024-03-19 PROBLEM — R53.83 FATIGUE: Status: RESOLVED | Noted: 2023-05-25 | Resolved: 2024-03-19

## 2024-03-19 PROBLEM — S69.90XA INJURY OF FINGER: Status: RESOLVED | Noted: 2023-11-16 | Resolved: 2024-03-19

## 2024-03-19 PROBLEM — N18.30 STAGE 3 CHRONIC KIDNEY DISEASE (MULTI): Status: RESOLVED | Noted: 2023-05-22 | Resolved: 2024-03-19

## 2024-03-19 PROBLEM — D64.9 LOW HEMOGLOBIN: Status: RESOLVED | Noted: 2023-05-25 | Resolved: 2024-03-19

## 2024-03-19 PROBLEM — M75.40 IMPINGEMENT SYNDROME OF SHOULDER REGION: Status: ACTIVE | Noted: 2024-03-19

## 2024-03-19 PROBLEM — S22.39XA CLOSED FRACTURE OF ONE RIB: Status: RESOLVED | Noted: 2023-11-16 | Resolved: 2024-03-19

## 2024-03-19 PROCEDURE — 1123F ACP DISCUSS/DSCN MKR DOCD: CPT | Performed by: INTERNAL MEDICINE

## 2024-03-19 PROCEDURE — 3074F SYST BP LT 130 MM HG: CPT | Performed by: INTERNAL MEDICINE

## 2024-03-19 PROCEDURE — 99214 OFFICE O/P EST MOD 30 MIN: CPT | Performed by: INTERNAL MEDICINE

## 2024-03-19 PROCEDURE — 1158F ADVNC CARE PLAN TLK DOCD: CPT | Performed by: INTERNAL MEDICINE

## 2024-03-19 PROCEDURE — 1159F MED LIST DOCD IN RCRD: CPT | Performed by: INTERNAL MEDICINE

## 2024-03-19 PROCEDURE — 3079F DIAST BP 80-89 MM HG: CPT | Performed by: INTERNAL MEDICINE

## 2024-03-19 PROCEDURE — 1160F RVW MEDS BY RX/DR IN RCRD: CPT | Performed by: INTERNAL MEDICINE

## 2024-03-19 PROCEDURE — 1036F TOBACCO NON-USER: CPT | Performed by: INTERNAL MEDICINE

## 2024-03-19 PROCEDURE — 1126F AMNT PAIN NOTED NONE PRSNT: CPT | Performed by: INTERNAL MEDICINE

## 2024-03-19 RX ORDER — PROPRANOLOL HYDROCHLORIDE 20 MG/1
20 TABLET ORAL 2 TIMES DAILY
Qty: 180 TABLET | Refills: 1 | Status: SHIPPED | OUTPATIENT
Start: 2024-03-19

## 2024-03-19 ASSESSMENT — PAIN SCALES - GENERAL: PAINLEVEL: 0-NO PAIN

## 2024-03-19 NOTE — PROGRESS NOTES
"Patient ID:   Kashif Jung is a 73 y.o. male with PMH remarkable for CKD3, HTN, tonsillectomy, h/o shoulder surgery who presents to the office today for Follow-up.    HEALTH MAINTENANCE: Follow Up  Last Office Visit: 2023  Last Labs: 1/3/2024  PSA Screening (starting at age 55 till 69): 0.62 on 2023  Colonoscopy (45-75 or age 40 with 1st degree relative dx colon ca): 2019 with Dr Lomas  Lung cancer screening (50-76 y/o + 20 pack year + smoking/quit in last 15 years): 2023  DEXA (65+, q 2 years women and 70+ men):  osteopenia -> DUE  - saw Neurosurgery for HA and CT head (obtained after striking head on shower bar) due to showing cortical hyperattenuation within the anterior frontal love that was most likely artifact. She ordered a repeat CT Head. Repeat was -ve. Neurosurgery advised that it was likely artifact and to FU PRN.   - reviewed last documentation from Dr Mcgee, Nephrology  - has been seeing chiropractor for back pain.  - inquiring about anemia of chronic disease. Iron 111, TIBC 277, % 40, ferritin 120 in 2020.  -- will check CBC with diff, vitamin B12 level, folate, retic, and iron panel + ferritin level.   -- pending results of the labs, may need to refer to GI and Hematology for an evaluation    Social History     Tobacco Use    Smoking status: Former     Types: Cigarettes     Quit date:      Years since quittin.2    Smokeless tobacco: Never   Vaping Use    Vaping Use: Never used   Substance Use Topics    Alcohol use: Yes     Comment: rare    Drug use: Never     Review of Systems   All other systems reviewed and are negative.    Visit Vitals  /82   Pulse 57   Resp 18   Ht 1.753 m (5' 9\")   Wt 86.2 kg (190 lb)   SpO2 97%   BMI 28.06 kg/m²   Smoking Status Former   BSA 2.05 m²     Physical Exam  Vitals reviewed.   Constitutional:       General: He is not in acute distress.     Appearance: Normal appearance. He is not ill-appearing.   HENT:      Head: Normocephalic " and atraumatic.      Right Ear: Tympanic membrane and external ear normal.      Left Ear: Tympanic membrane and external ear normal.      Nose: Nose normal.      Mouth/Throat:      Mouth: Mucous membranes are moist.      Pharynx: Oropharynx is clear.   Eyes:      Conjunctiva/sclera: Conjunctivae normal.      Pupils: Pupils are equal, round, and reactive to light.   Cardiovascular:      Rate and Rhythm: Normal rate and regular rhythm.      Heart sounds: Normal heart sounds. No murmur heard.  Pulmonary:      Effort: Pulmonary effort is normal. No respiratory distress.      Breath sounds: Normal breath sounds. No wheezing.   Abdominal:      General: There is no distension.      Palpations: Abdomen is soft. There is no mass.      Tenderness: There is no abdominal tenderness.   Musculoskeletal:         General: Normal range of motion.      Cervical back: Normal range of motion and neck supple.   Skin:     General: Skin is warm and dry.   Neurological:      General: No focal deficit present.      Mental Status: He is alert and oriented to person, place, and time.      Sensory: No sensory deficit.      Motor: No weakness.      Coordination: Coordination normal.      Gait: Gait normal.   Psychiatric:         Mood and Affect: Mood normal.         Behavior: Behavior normal.       Current Outpatient Medications   Medication Instructions    acetaminophen (TYLENOL) 500 mg, oral, Every 6 hours PRN    ascorbic acid (Vitamin C) 1,000 mg tablet 1 tablet, oral, Daily    calcium 500 mg calcium (1,250 mg) tablet 1 tablet, oral, Daily    cholecalciferol (VITAMIN D-3) 50 mcg, oral, Daily    propranolol (INDERAL) 20 mg, oral, 2 times daily      Lab Results   Component Value Date    WBC 4.5 01/03/2024    HGB 12.6 (L) 01/03/2024    HCT 38.4 (L) 01/03/2024     01/03/2024    CHOL 194 11/17/2023    TRIG 75 11/17/2023    HDL 57.0 11/17/2023    ALT 11 11/17/2023    AST 14 11/17/2023     01/03/2024    K 4.3 01/03/2024      01/03/2024    CREATININE 1.50 (H) 01/03/2024    BUN 30 (H) 01/03/2024    CO2 29 01/03/2024    TSH 2.95 11/17/2023     Assessment/Plan   Problem List Items Addressed This Visit             ICD-10-CM    Anemia of chronic disease D63.8     -- will check CBC with diff, vitamin B12 level, folate, retic, and iron panel + ferritin level.   -- pending results of the labs, may need to refer to GI and Hematology for an evaluation         Relevant Orders    CBC and Auto Differential    Vitamin B12    Ferritin    Iron and TIBC    Reticulocytes    Folate    Intention tremor G25.2     - c/w propranolol         Relevant Medications    propranolol (Inderal) 20 mg tablet    Hypertension I10     - c/w propranolol         Relevant Medications    propranolol (Inderal) 20 mg tablet    CKD (chronic kidney disease) stage 3, GFR 30-59 ml/min (CMS/[facility]) N18.30     - reviewed Nephrology's documentation  - stable kidney function for pt with baseline creat 1.3         Osteopenia of neck of left femur M85.852     - c/w calcium+D  - will repeat dexa scan  - last dexa was 2020         Relevant Orders    XR DEXA bone density     Other Visit Diagnoses         Codes    Healthcare maintenance     Z00.00    Relevant Orders    Full code (Completed)    Chronic kidney disease, unspecified CKD stage     N18.9    Relevant Orders    CBC and Auto Differential    Vitamin B12    Ferritin    Iron and TIBC          --------------------  Written by Liana Lerner RN, acting as a scribe for Dr. Jackson. This note accurately reflects the work and decisions made by Dr. Jackson.     I, Dr. Jackson, attest all medical record entries made by the scribe were under my direction and were personally dictated by me. I have reviewed the chart and agree that the record accurately reflects my performance of the history, physical exam, and assessment and plan.

## 2024-03-19 NOTE — PATIENT INSTRUCTIONS
It was nice to see you in the office today!  As discussed during our visit...     Someone will call you regarding scheduling your Dexa Scan.  -----------------------------------  Please have your blood drawn as soon as you can.  We will contact you with the results of your blood work and any necessary adjustments to your plan of care.  Iif you do not hear from us within 3-5 days of having your blood drawn, please call the [place] office at [phone].     The two closest Outpatient Lab's to this office is:  [facility]  [address] [address]    Hours:   Monday through Friday 7:30am - 4:30pm (Closed 12:30-1pm for lunch)     Or you can go to ...  [place] [id number]  [place], [place] [id number] [phone]    Hours:   Monday through Friday 7:30am - 4:30pm (Closed 12:30-1pm for lunch)   Saturday 8am - 12pm    Website to confirm hours and location OR look up more locations ... [phone]     --------------------------------------------------------------------------    Dr Jackson has ordered a bone density test (also called a Dexa Scan) for you to obtain. The entire scan takes approximately 30 minutes.    This test will be able to tell if there is any decreased bone mass that makes the bones more brittle which in turn makes you prone to fractures. A bone density test is used mainly to diagnose osteopenia and osteoporosis.     Standard X-rays may show weakened bones. But at the point when bone weakness can be seen on standard X-rays, it may be too far advanced to treat. Bone densitometry testing can find decreasing bone density and strength at a much earlier stage when treatment can help.    Some common risk factors for osteopenia and/or osteoporosis are:  Postmenopausal women not taking estrogen  Advancing age, women older than age 65 and men older than age  70  Smoking  Drinking too much alcohol  Ethnic background  Family history of hip fracture or osteoporosis  Using steroids long-term or certain other medicines  Certain diseases, including rheumatoid arthritis, type 1 diabetes mellitus, liver disease, kidney disease, hyperthyroidism, inflammatory bowel disorders, or hyperparathyroidism  Low BMI (body mass index)    Website with more information:  [email or web address]     ------------------------    Follow up in 4 months or sooner if needed.

## 2024-03-20 PROBLEM — M85.852 OSTEOPENIA OF NECK OF LEFT FEMUR: Status: ACTIVE | Noted: 2024-03-20

## 2024-03-20 NOTE — ASSESSMENT & PLAN NOTE
-- will check CBC with diff, vitamin B12 level, folate, retic, and iron panel + ferritin level.   -- pending results of the labs, may need to refer to GI and Hematology for an evaluation

## 2024-03-21 ENCOUNTER — LAB (OUTPATIENT)
Dept: LAB | Facility: LAB | Age: 74
End: 2024-03-21
Payer: MEDICARE

## 2024-03-21 DIAGNOSIS — D63.8 ANEMIA OF CHRONIC DISEASE: ICD-10-CM

## 2024-03-21 DIAGNOSIS — N18.9 CHRONIC KIDNEY DISEASE, UNSPECIFIED CKD STAGE: ICD-10-CM

## 2024-03-21 LAB
BASOPHILS # BLD AUTO: 0.03 X10*3/UL (ref 0–0.1)
BASOPHILS NFR BLD AUTO: 0.6 %
EOSINOPHIL # BLD AUTO: 0.19 X10*3/UL (ref 0–0.4)
EOSINOPHIL NFR BLD AUTO: 3.6 %
ERYTHROCYTE [DISTWIDTH] IN BLOOD BY AUTOMATED COUNT: 13 % (ref 11.5–14.5)
FERRITIN SERPL-MCNC: 128 NG/ML (ref 20–300)
FOLATE SERPL-MCNC: 17.1 NG/ML
HCT VFR BLD AUTO: 39.6 % (ref 41–52)
HGB BLD-MCNC: 13.1 G/DL (ref 13.5–17.5)
HGB RETIC QN: 36 PG (ref 28–38)
IMM GRANULOCYTES # BLD AUTO: 0.01 X10*3/UL (ref 0–0.5)
IMM GRANULOCYTES NFR BLD AUTO: 0.2 % (ref 0–0.9)
IMMATURE RETIC FRACTION: 10.6 %
IRON SATN MFR SERPL: 48 % (ref 25–45)
IRON SERPL-MCNC: 142 UG/DL (ref 35–150)
LYMPHOCYTES # BLD AUTO: 1.59 X10*3/UL (ref 0.8–3)
LYMPHOCYTES NFR BLD AUTO: 30.1 %
MCH RBC QN AUTO: 32.3 PG (ref 26–34)
MCHC RBC AUTO-ENTMCNC: 33.1 G/DL (ref 32–36)
MCV RBC AUTO: 98 FL (ref 80–100)
MONOCYTES # BLD AUTO: 0.63 X10*3/UL (ref 0.05–0.8)
MONOCYTES NFR BLD AUTO: 11.9 %
NEUTROPHILS # BLD AUTO: 2.83 X10*3/UL (ref 1.6–5.5)
NEUTROPHILS NFR BLD AUTO: 53.6 %
NRBC BLD-RTO: 0 /100 WBCS (ref 0–0)
PLATELET # BLD AUTO: 255 X10*3/UL (ref 150–450)
RBC # BLD AUTO: 4.06 X10*6/UL (ref 4.5–5.9)
RETICS #: 0.05 X10*6/UL (ref 0.02–0.11)
RETICS/RBC NFR AUTO: 1.3 % (ref 0.5–2)
TIBC SERPL-MCNC: 293 UG/DL (ref 240–445)
UIBC SERPL-MCNC: 151 UG/DL (ref 110–370)
VIT B12 SERPL-MCNC: 439 PG/ML (ref 211–911)
WBC # BLD AUTO: 5.3 X10*3/UL (ref 4.4–11.3)

## 2024-03-21 PROCEDURE — 83540 ASSAY OF IRON: CPT

## 2024-03-21 PROCEDURE — 83550 IRON BINDING TEST: CPT

## 2024-03-21 PROCEDURE — 82607 VITAMIN B-12: CPT

## 2024-03-21 PROCEDURE — 82728 ASSAY OF FERRITIN: CPT

## 2024-03-21 PROCEDURE — 36415 COLL VENOUS BLD VENIPUNCTURE: CPT

## 2024-03-21 PROCEDURE — 82746 ASSAY OF FOLIC ACID SERUM: CPT

## 2024-03-21 PROCEDURE — 85025 COMPLETE CBC W/AUTO DIFF WBC: CPT

## 2024-03-21 PROCEDURE — 85045 AUTOMATED RETICULOCYTE COUNT: CPT

## 2024-03-29 ENCOUNTER — HOSPITAL ENCOUNTER (OUTPATIENT)
Dept: RADIOLOGY | Facility: HOSPITAL | Age: 74
Discharge: HOME | End: 2024-03-29
Payer: MEDICARE

## 2024-03-29 DIAGNOSIS — M85.852 OSTEOPENIA OF NECK OF LEFT FEMUR: ICD-10-CM

## 2024-03-29 PROCEDURE — 77080 DXA BONE DENSITY AXIAL: CPT

## 2024-03-29 PROCEDURE — 77080 DXA BONE DENSITY AXIAL: CPT | Performed by: RADIOLOGY

## 2024-07-19 ENCOUNTER — OFFICE VISIT (OUTPATIENT)
Dept: PRIMARY CARE | Facility: CLINIC | Age: 74
End: 2024-07-19
Payer: MEDICARE

## 2024-07-19 VITALS
SYSTOLIC BLOOD PRESSURE: 139 MMHG | HEART RATE: 51 BPM | BODY MASS INDEX: 27.81 KG/M2 | HEIGHT: 69 IN | OXYGEN SATURATION: 97 % | DIASTOLIC BLOOD PRESSURE: 86 MMHG | WEIGHT: 187.8 LBS | RESPIRATION RATE: 18 BRPM

## 2024-07-19 DIAGNOSIS — Z00.00 HEALTH CARE MAINTENANCE: ICD-10-CM

## 2024-07-19 DIAGNOSIS — N18.31 STAGE 3A CHRONIC KIDNEY DISEASE (MULTI): ICD-10-CM

## 2024-07-19 DIAGNOSIS — M85.852 OSTEOPENIA OF NECK OF LEFT FEMUR: ICD-10-CM

## 2024-07-19 DIAGNOSIS — I15.9 SECONDARY HYPERTENSION: ICD-10-CM

## 2024-07-19 PROCEDURE — 3079F DIAST BP 80-89 MM HG: CPT | Performed by: INTERNAL MEDICINE

## 2024-07-19 PROCEDURE — 1159F MED LIST DOCD IN RCRD: CPT | Performed by: INTERNAL MEDICINE

## 2024-07-19 PROCEDURE — 3008F BODY MASS INDEX DOCD: CPT | Performed by: INTERNAL MEDICINE

## 2024-07-19 PROCEDURE — 1160F RVW MEDS BY RX/DR IN RCRD: CPT | Performed by: INTERNAL MEDICINE

## 2024-07-19 PROCEDURE — 99213 OFFICE O/P EST LOW 20 MIN: CPT | Performed by: INTERNAL MEDICINE

## 2024-07-19 PROCEDURE — 3075F SYST BP GE 130 - 139MM HG: CPT | Performed by: INTERNAL MEDICINE

## 2024-07-19 PROCEDURE — 1125F AMNT PAIN NOTED PAIN PRSNT: CPT | Performed by: INTERNAL MEDICINE

## 2024-07-19 PROCEDURE — 1036F TOBACCO NON-USER: CPT | Performed by: INTERNAL MEDICINE

## 2024-07-19 PROCEDURE — 1123F ACP DISCUSS/DSCN MKR DOCD: CPT | Performed by: INTERNAL MEDICINE

## 2024-07-19 ASSESSMENT — PATIENT HEALTH QUESTIONNAIRE - PHQ9
10. IF YOU CHECKED OFF ANY PROBLEMS, HOW DIFFICULT HAVE THESE PROBLEMS MADE IT FOR YOU TO DO YOUR WORK, TAKE CARE OF THINGS AT HOME, OR GET ALONG WITH OTHER PEOPLE: SOMEWHAT DIFFICULT
1. LITTLE INTEREST OR PLEASURE IN DOING THINGS: NOT AT ALL
2. FEELING DOWN, DEPRESSED OR HOPELESS: SEVERAL DAYS
SUM OF ALL RESPONSES TO PHQ9 QUESTIONS 1 AND 2: 1

## 2024-07-19 ASSESSMENT — ENCOUNTER SYMPTOMS
PSYCHIATRIC NEGATIVE: 1
RESPIRATORY NEGATIVE: 1
ALLERGIC/IMMUNOLOGIC NEGATIVE: 1
GASTROINTESTINAL NEGATIVE: 1
CONSTITUTIONAL NEGATIVE: 1
ARTHRALGIAS: 1
MYALGIAS: 1
ENDOCRINE NEGATIVE: 1
NEUROLOGICAL NEGATIVE: 1
HEMATOLOGIC/LYMPHATIC NEGATIVE: 1
EYES NEGATIVE: 1
CARDIOVASCULAR NEGATIVE: 1

## 2024-07-19 ASSESSMENT — PAIN SCALES - GENERAL: PAINLEVEL: 2

## 2024-07-19 NOTE — PROGRESS NOTES
Patient ID:   Kashif Jung is a 73 y.o. male with PMH remarkable for CKD3, HTN, tonsillectomy, h/o shoulder surgery who presents to the office today for Follow-up.    HEALTH MAINTENANCE: Follow Up  Last Office Visit: 3/19/2024 for FU, and further investigation of anemia. More levels were checked and everything appears stable. Dexa scan ordered and done on 3/29/2024 and was normal. Offered hematology or GI consult after lab work was reviewed but pt declined.  Last Labs: 1/3/2024 and 3/21/2024 --> will check at next visit.   PSA Screening (starting at age 55 till 69): 0.62 on 2023  Colonoscopy (45-75 or age 40 with 1st degree relative dx colon ca): 2019 with Dr Lomas  Lung cancer screening (50-78 y/o + 20 pack year + smoking/quit in last 15 years): 2023  DEXA (65+, q 2 years women and 70+ men): 3/29/2024 normal  - saw Neurosurgery for HA and CT head (obtained after striking head on shower bar) due to showing cortical hyperattenuation within the anterior frontal love that was most likely artifact. She ordered a repeat CT Head. Repeat was -ve. Neurosurgery advised that it was likely artifact and to FU PRN.   - reviewed last documentation from Dr Mcgee, Nephrology  - has been seeing chiropractor for back pain.    Reports that he gets elbow pain on the left side on and off. Does not recall an injury.     Social History     Tobacco Use    Smoking status: Former     Current packs/day: 0.00     Types: Cigarettes     Quit date:      Years since quittin.5    Smokeless tobacco: Never   Vaping Use    Vaping status: Never Used   Substance Use Topics    Alcohol use: Yes     Comment: rare    Drug use: Never     Review of Systems   Constitutional: Negative.    HENT: Negative.     Eyes: Negative.    Respiratory: Negative.     Cardiovascular: Negative.    Gastrointestinal: Negative.    Endocrine: Negative.    Genitourinary: Negative.    Musculoskeletal:  Positive for arthralgias and myalgias.  "  Allergic/Immunologic: Negative.    Neurological: Negative.    Hematological: Negative.    Psychiatric/Behavioral: Negative.     All other systems reviewed and are negative.    Visit Vitals  /86   Pulse 51   Resp 18   Ht 1.753 m (5' 9\")   Wt 85.2 kg (187 lb 12.8 oz)   SpO2 97%   BMI 27.73 kg/m²   Smoking Status Former   BSA 2.04 m²     Physical Exam  Vitals reviewed.   Constitutional:       General: He is not in acute distress.     Appearance: Normal appearance. He is not ill-appearing.   HENT:      Head: Normocephalic and atraumatic.      Right Ear: Tympanic membrane and external ear normal.      Left Ear: Tympanic membrane and external ear normal.      Nose: Nose normal.      Mouth/Throat:      Mouth: Mucous membranes are moist.      Pharynx: Oropharynx is clear.   Eyes:      Conjunctiva/sclera: Conjunctivae normal.      Pupils: Pupils are equal, round, and reactive to light.   Cardiovascular:      Rate and Rhythm: Normal rate and regular rhythm.      Heart sounds: Normal heart sounds. No murmur heard.  Pulmonary:      Effort: Pulmonary effort is normal. No respiratory distress.      Breath sounds: Normal breath sounds. No wheezing.   Abdominal:      General: There is no distension.      Palpations: Abdomen is soft. There is no mass.      Tenderness: There is no abdominal tenderness.   Musculoskeletal:         General: Normal range of motion.      Left elbow: Tenderness present.      Cervical back: Normal range of motion and neck supple.   Skin:     General: Skin is warm and dry.   Neurological:      General: No focal deficit present.      Mental Status: He is alert and oriented to person, place, and time.      Sensory: No sensory deficit.      Motor: No weakness.      Coordination: Coordination normal.      Gait: Gait normal.   Psychiatric:         Mood and Affect: Mood normal.         Behavior: Behavior normal.         Current Outpatient Medications   Medication Instructions    acetaminophen (TYLENOL) 500 " mg, oral, Every 6 hours PRN    ascorbic acid (Vitamin C) 1,000 mg tablet 1 tablet, oral, Daily    calcium 500 mg calcium (1,250 mg) tablet 1 tablet, oral, Daily    cholecalciferol (VITAMIN D-3) 50 mcg, oral, Daily    propranolol (INDERAL) 20 mg, oral, 2 times daily      Lab Results   Component Value Date    WBC 5.3 03/21/2024    HGB 13.1 (L) 03/21/2024    HCT 39.6 (L) 03/21/2024     03/21/2024    CHOL 194 11/17/2023    TRIG 75 11/17/2023    HDL 57.0 11/17/2023    ALT 11 11/17/2023    AST 14 11/17/2023     01/03/2024    K 4.3 01/03/2024     01/03/2024    CREATININE 1.50 (H) 01/03/2024    BUN 30 (H) 01/03/2024    CO2 29 01/03/2024    TSH 2.95 11/17/2023     Problem List Items Addressed This Visit             ICD-10-CM    Hypertension I10     - c/w propranolol         CKD (chronic kidney disease) stage 3, GFR 30-59 ml/min (Multi) N18.30     - reviewed Nephrology's documentation  - stable kidney function for pt with baseline creat 1.3         Osteopenia of neck of left femur M85.852     - c/w calcium+D  - reviewed last dexa         Health care maintenance Z00.00       --------------------  Written by Liana Lerner RN, acting as a scribe for Dr. Jackson. This note accurately reflects the work and decisions made by Dr. Jackson.     I, Dr. Jackson, attest all medical record entries made by the scribe were under my direction and were personally dictated by me. I have reviewed the chart and agree that the record accurately reflects my performance of the history, physical exam, and assessment and plan.

## 2024-07-22 ENCOUNTER — APPOINTMENT (OUTPATIENT)
Dept: PRIMARY CARE | Facility: CLINIC | Age: 74
End: 2024-07-22
Payer: MEDICARE

## 2024-09-09 DIAGNOSIS — I10 HYPERTENSION, UNSPECIFIED TYPE: ICD-10-CM

## 2024-09-09 DIAGNOSIS — G25.2 INTENTION TREMOR: ICD-10-CM

## 2024-09-09 RX ORDER — PROPRANOLOL HYDROCHLORIDE 20 MG/1
20 TABLET ORAL 2 TIMES DAILY
Qty: 180 TABLET | Refills: 3 | Status: SHIPPED | OUTPATIENT
Start: 2024-09-09

## 2024-09-10 ENCOUNTER — TELEPHONE (OUTPATIENT)
Dept: PRIMARY CARE | Facility: CLINIC | Age: 74
End: 2024-09-10
Payer: COMMERCIAL

## 2024-09-10 DIAGNOSIS — U07.1 COVID-19: Primary | ICD-10-CM

## 2024-09-10 RX ORDER — DEXAMETHASONE 4 MG/1
4 TABLET ORAL
Qty: 5 TABLET | Refills: 0 | Status: SHIPPED | OUTPATIENT
Start: 2024-09-10 | End: 2024-09-15

## 2024-09-10 RX ORDER — AZITHROMYCIN 500 MG/1
500 TABLET, FILM COATED ORAL DAILY
Qty: 5 TABLET | Refills: 0 | Status: SHIPPED | OUTPATIENT
Start: 2024-09-10 | End: 2024-09-15

## 2024-09-10 RX ORDER — NIRMATRELVIR AND RITONAVIR 300-100 MG
3 KIT ORAL 2 TIMES DAILY
Qty: 30 TABLET | Refills: 0 | Status: SHIPPED | OUTPATIENT
Start: 2024-09-10 | End: 2024-09-15

## 2024-11-19 ENCOUNTER — APPOINTMENT (OUTPATIENT)
Dept: PRIMARY CARE | Facility: CLINIC | Age: 74
End: 2024-11-19
Payer: COMMERCIAL

## 2024-11-19 ENCOUNTER — OFFICE VISIT (OUTPATIENT)
Dept: PRIMARY CARE | Facility: CLINIC | Age: 74
End: 2024-11-19
Payer: MEDICARE

## 2024-11-19 VITALS
HEART RATE: 66 BPM | RESPIRATION RATE: 18 BRPM | BODY MASS INDEX: 26.22 KG/M2 | HEIGHT: 69 IN | WEIGHT: 177 LBS | OXYGEN SATURATION: 96 % | SYSTOLIC BLOOD PRESSURE: 125 MMHG | DIASTOLIC BLOOD PRESSURE: 75 MMHG

## 2024-11-19 DIAGNOSIS — D63.8 ANEMIA OF CHRONIC DISEASE: ICD-10-CM

## 2024-11-19 DIAGNOSIS — M54.2 NECK PAIN: ICD-10-CM

## 2024-11-19 DIAGNOSIS — Z12.11 ENCOUNTER FOR SCREENING FOR MALIGNANT NEOPLASM OF COLON: ICD-10-CM

## 2024-11-19 DIAGNOSIS — M85.852 OSTEOPENIA OF NECK OF LEFT FEMUR: ICD-10-CM

## 2024-11-19 DIAGNOSIS — Z00.00 ENCOUNTER FOR ANNUAL WELLNESS VISIT (AWV) IN MEDICARE PATIENT: ICD-10-CM

## 2024-11-19 DIAGNOSIS — G25.2 INTENTION TREMOR: ICD-10-CM

## 2024-11-19 DIAGNOSIS — R25.1 TREMOR, UNSPECIFIED: ICD-10-CM

## 2024-11-19 DIAGNOSIS — Z13.220 LIPID SCREENING: ICD-10-CM

## 2024-11-19 DIAGNOSIS — N18.31 STAGE 3A CHRONIC KIDNEY DISEASE (MULTI): ICD-10-CM

## 2024-11-19 PROCEDURE — 1123F ACP DISCUSS/DSCN MKR DOCD: CPT | Performed by: INTERNAL MEDICINE

## 2024-11-19 PROCEDURE — 1159F MED LIST DOCD IN RCRD: CPT | Performed by: INTERNAL MEDICINE

## 2024-11-19 PROCEDURE — 3078F DIAST BP <80 MM HG: CPT | Performed by: INTERNAL MEDICINE

## 2024-11-19 PROCEDURE — 1160F RVW MEDS BY RX/DR IN RCRD: CPT | Performed by: INTERNAL MEDICINE

## 2024-11-19 PROCEDURE — G0439 PPPS, SUBSEQ VISIT: HCPCS | Performed by: INTERNAL MEDICINE

## 2024-11-19 PROCEDURE — 99214 OFFICE O/P EST MOD 30 MIN: CPT | Performed by: INTERNAL MEDICINE

## 2024-11-19 PROCEDURE — 1125F AMNT PAIN NOTED PAIN PRSNT: CPT | Performed by: INTERNAL MEDICINE

## 2024-11-19 PROCEDURE — 3074F SYST BP LT 130 MM HG: CPT | Performed by: INTERNAL MEDICINE

## 2024-11-19 PROCEDURE — 3008F BODY MASS INDEX DOCD: CPT | Performed by: INTERNAL MEDICINE

## 2024-11-19 PROCEDURE — 1036F TOBACCO NON-USER: CPT | Performed by: INTERNAL MEDICINE

## 2024-11-19 PROCEDURE — 1170F FXNL STATUS ASSESSED: CPT | Performed by: INTERNAL MEDICINE

## 2024-11-19 RX ORDER — DAPAGLIFLOZIN 5 MG/1
5 TABLET, FILM COATED ORAL DAILY
Qty: 90 TABLET | Refills: 1 | Status: SHIPPED | OUTPATIENT
Start: 2024-11-19

## 2024-11-19 ASSESSMENT — ACTIVITIES OF DAILY LIVING (ADL)
GROCERY_SHOPPING: INDEPENDENT
DOING_HOUSEWORK: INDEPENDENT
TAKING_MEDICATION: INDEPENDENT
MANAGING_FINANCES: INDEPENDENT
DRESSING: INDEPENDENT
BATHING: INDEPENDENT

## 2024-11-19 ASSESSMENT — PATIENT HEALTH QUESTIONNAIRE - PHQ9
1. LITTLE INTEREST OR PLEASURE IN DOING THINGS: NOT AT ALL
SUM OF ALL RESPONSES TO PHQ9 QUESTIONS 1 AND 2: 0
2. FEELING DOWN, DEPRESSED OR HOPELESS: NOT AT ALL

## 2024-11-19 ASSESSMENT — PAIN SCALES - GENERAL: PAINLEVEL_OUTOF10: 1

## 2024-11-19 NOTE — ASSESSMENT & PLAN NOTE
- saw Neurosurgery for HA s/p fall and CT head (obtained after striking head on shower bar) due to showing cortical hyperattenuation within the anterior frontal love that was most likely artifact. She ordered a repeat CT Head. Repeat was -ve. Neurosurgery advised that it was likely artifact and to FU PRN.   - headaches are felt to be related to cervical spine. Has been seeing chiropractor.   - had a MRI Cspine in 2012 and showed mild cervical spondylosis without interval canal narrowing.  - wife is undergoing some procedures and was recently dx with cancer. She is using a wheelchair and he pushes her over the carpet in the house which is strenuous.  - will get xray of cspine  - declines PT eval d/t seeing the chiropractor regularly

## 2024-11-19 NOTE — ASSESSMENT & PLAN NOTE
- reviewed Nephrology's documentation  - stable kidney function for pt with baseline creat 1.37  - start on farixga for protection

## 2024-11-19 NOTE — PROGRESS NOTES
Patient ID:   Kashif Jung is a 74 y.o. male with PMH remarkable for CKD3, HTN, tonsillectomy, h/o shoulder surgery who presents to the office today for Medicare Annual Wellness Visit Subsequent.    HEALTH MAINTENANCE: Annual Medicare Wellness Physical  Last Office Visit: 7/19/2024 for FU. Endorsed elbow pain on and off on the left side without injury. Regarding anemia.. following with a chiropractor for back pain.  Last Labs: 1/3/2024 and 3/21/2024 --> DUE  PSA Screening (starting at age 55 till 69): 0.62 on 11/17/2023  Colonoscopy (45-75 or age 40 with 1st degree relative dx colon ca): 12/16/2019 with Dr Lomas with 2 polyps removed. Pathology showing sessile serrated adenoma and hyperplastic polyp. Repeat due in 5 years. DUE  Lung cancer screening (50-78 y/o + 20 pack year + smoking/quit in last 15 years): 12/1/2023 without mention of any significant pulmonary nodules.  DEXA (65+, q 2 years women and 70+ men): 3/29/2024 normal  - saw Neurosurgery for HA s/p fall and CT head (obtained after striking head on shower bar) due to showing cortical hyperattenuation within the anterior frontal love that was most likely artifact. She ordered a repeat CT Head. Repeat was -ve. Neurosurgery advised that it was likely artifact and to FU PRN.   - reviewed last documentation from Dr Mcgee, Nephrology. Baseline creat 1.3.  - headaches are felt to be related to cervical spine. Has been seeing chiropractor.   - had a MRI C Spine in 2012 and showed mild cervical spondylosis without interval canal narrowing.  - wife is undergoing some procedures and was recently dx with cancer. She is using a wheelchair and he pushes her over the carpet in the house which is strenuous.    Past Medical History:   Diagnosis Date    Acute bronchitis 05/25/2023    Complete tear of right rotator cuff 05/25/2023    Cough 05/25/2023    Dysfunction of left eustachian tube 05/25/2023    Elevated LDL cholesterol level 05/25/2023    Fatigue 05/25/2023     Fever 2023    Other conditions influencing health status     No significant past medical history    Pruritus ani 2023    Right shoulder pain 2023    Tinnitus, bilateral 2023    URI (upper respiratory infection) 2023      Past Surgical History:   Procedure Laterality Date    SHOULDER SURGERY  09/10/2014    Shoulder Surgery      Social History     Tobacco Use    Smoking status: Former     Current packs/day: 0.00     Types: Cigarettes     Quit date:      Years since quittin.9    Smokeless tobacco: Never   Vaping Use    Vaping status: Never Used   Substance Use Topics    Alcohol use: Yes     Comment: rare    Drug use: Never     No family history on file.   Immunization History   Administered Date(s) Administered    Flu vaccine, quadrivalent, high-dose, preservative free, age 65y+ (FLUZONE) 10/21/2022, 2023    Influenza, Seasonal, Quadrivalent, Adjuvanted 10/10/2020, 2021    Influenza, injectable, quadrivalent 2019    Moderna SARS-CoV-2 Vaccination 02/10/2021, 2021, 11/10/2021, 2022    Pneumococcal conjugate vaccine, 13-valent (PREVNAR 13) 10/10/2020    Pneumococcal polysaccharide vaccine, 23-valent, age 2 years and older (PNEUMOVAX 23) 10/01/2014, 2021    RSV, 60 Years And Older (AREXVY) 2023    Tdap vaccine, age 7 year and older (BOOSTRIX, ADACEL) 2022    Zoster vaccine, recombinant, adult (SHINGRIX) 2023, 2023     Review of Systems   Constitutional: Negative.    HENT: Negative.     Eyes: Negative.    Respiratory: Negative.     Cardiovascular: Negative.    Gastrointestinal: Negative.    Endocrine: Negative.    Genitourinary: Negative.    Musculoskeletal:  Positive for arthralgias, myalgias and neck pain.   Skin: Negative.    Neurological: Negative.    Psychiatric/Behavioral: Negative.     All other systems reviewed and are negative.    Allergies   Allergen Reactions    Codeine Unknown    Penicillins Rash     Visit  "Vitals  /75 (BP Location: Left arm, Patient Position: Sitting)   Pulse 66   Resp 18   Ht 1.753 m (5' 9\")   Wt 80.3 kg (177 lb)   SpO2 96%   BMI 26.14 kg/m²   Smoking Status Former   BSA 1.98 m²   Weight 187# at last visit    Physical Exam  Vitals reviewed. Exam conducted with a chaperone present.   Constitutional:       Appearance: Normal appearance. He is well-developed.   HENT:      Head: Normocephalic.      Right Ear: External ear normal.      Left Ear: External ear normal.      Nose: Nose normal.      Mouth/Throat:      Lips: Pink.      Mouth: Mucous membranes are moist.   Eyes:      General: Lids are normal.      Pupils: Pupils are equal, round, and reactive to light.   Neck:      Trachea: Trachea normal.   Cardiovascular:      Rate and Rhythm: Normal rate and regular rhythm.      Heart sounds: Normal heart sounds.   Pulmonary:      Effort: Pulmonary effort is normal.      Breath sounds: Normal breath sounds.   Abdominal:      General: Bowel sounds are normal.      Palpations: Abdomen is soft.   Musculoskeletal:      Cervical back: Tenderness present. Decreased range of motion.   Skin:     General: Skin is warm and moist.   Neurological:      General: No focal deficit present.      Mental Status: He is alert and oriented to person, place, and time. Mental status is at baseline.   Psychiatric:         Attention and Perception: Attention normal.         Mood and Affect: Mood normal.         Speech: Speech normal.         Behavior: Behavior is cooperative.         Thought Content: Thought content normal.         Cognition and Memory: Cognition normal.         Judgment: Judgment normal.       Current Outpatient Medications   Medication Instructions    acetaminophen (TYLENOL) 500 mg, Every 6 hours PRN    ascorbic acid (Vitamin C) 1,000 mg tablet 1 tablet, Daily    calcium 500 mg calcium (1,250 mg) tablet 1 tablet, Daily    cholecalciferol (VITAMIN D-3) 50 mcg, Daily    dapagliflozin propanediol (FARXIGA) 5 mg, " oral, Daily    propranolol (INDERAL) 20 mg, oral, 2 times daily     Lab Results   Component Value Date    WBC 5.3 03/21/2024    HGB 13.1 (L) 03/21/2024    HCT 39.6 (L) 03/21/2024     03/21/2024    CHOL 194 11/17/2023    TRIG 75 11/17/2023    HDL 57.0 11/17/2023    ALT 11 11/17/2023    AST 14 11/17/2023     01/03/2024    K 4.3 01/03/2024     01/03/2024    CREATININE 1.50 (H) 01/03/2024    BUN 30 (H) 01/03/2024    CO2 29 01/03/2024    TSH 2.95 11/17/2023     Problem List Items Addressed This Visit             ICD-10-CM    Anemia of chronic disease D63.8     - check CBC, ferritin, TIBC, Iron panel         Relevant Orders    CBC and Auto Differential    Iron and TIBC    Ferritin    Intention tremor G25.2     - c/w propranolol         CKD (chronic kidney disease) stage 3, GFR 30-59 ml/min (Multi) N18.30     - reviewed Nephrology's documentation  - stable kidney function for pt with baseline creat 1.37  - start on farixga for protection         Relevant Medications    dapagliflozin propanediol (Farxiga) 5 mg    Other Relevant Orders    CBC and Auto Differential    Comprehensive Metabolic Panel    Lipid Panel    TSH with reflex to Free T4 if abnormal    Vitamin D 25-Hydroxy,Total (for eval of Vitamin D levels)    Vitamin B12    Iron and TIBC    Ferritin    Osteopenia of neck of left femur M85.852     - c/w calcium+D  - reviewed last dexa from 3/29/2024 and it was normal         Encounter for annual wellness visit (AWV) in Medicare patient Z00.00     - Due for lab work         Relevant Orders    CBC and Auto Differential    Comprehensive Metabolic Panel    Lipid Panel    TSH with reflex to Free T4 if abnormal    Vitamin D 25-Hydroxy,Total (for eval of Vitamin D levels)    Vitamin B12    Iron and TIBC    Ferritin    Neck pain M54.2     - saw Neurosurgery for HA s/p fall and CT head (obtained after striking head on shower bar) due to showing cortical hyperattenuation within the anterior frontal love that  was most likely artifact. She ordered a repeat CT Head. Repeat was -ve. Neurosurgery advised that it was likely artifact and to FU PRN.   - headaches are felt to be related to cervical spine. Has been seeing chiropractor.   - had a MRI Cspine in 2012 and showed mild cervical spondylosis without interval canal narrowing.  - wife is undergoing some procedures and was recently dx with cancer. She is using a wheelchair and he pushes her over the carpet in the house which is strenuous.  - will get xray of cspine  - declines PT eval d/t seeing the chiropractor regularly          Other Visit Diagnoses         Codes    Lipid screening     Z13.220    Relevant Orders    Lipid Panel    Tremor, unspecified     R25.1    Relevant Orders    TSH with reflex to Free T4 if abnormal    Encounter for screening for malignant neoplasm of colon     Z12.11    Relevant Orders    Colonoscopy Screening; High Risk Patient; sessile serrated adenoma and hyperplastic polyp with Dr Lomas in 12/2019. Rec repeat in 2024.          --------------------  Written by Liana Lerner RN, acting as a scribe for Dr. Jackson. This note accurately reflects the work and decisions made by Dr. Jackson.     I, Dr. Jackson, attest all medical record entries made by the scribe were under my direction and were personally dictated by me. I have reviewed the chart and agree that the record accurately reflects my performance of the history, physical exam, and assessment and plan.

## 2024-11-19 NOTE — PATIENT INSTRUCTIONS
It was nice to see you in the office today!  As discussed during our visit...     We placed a referral for a repeat colonoscopy for you- it appears you are due next month for your 5 year scope. Someone will contact you regarding scheduling this soon.  Let us know if the new medication, FARIXGA, is not affordable and/or covered by insurance.  Dr Jackson ordered an Xray of your cervical spine to evaluate your neck.  ------------------------------------  Please have your 12 hour FASTING blood drawn in the next couple weeks.  You do NOT need an appointment for lab work and/or Xray's.  Do not get lab work done while you are on steroids (prednisone, medrol dose pack, dexamethasone) unless instructed differently by Dr Jackson because this can cause some labs to be off.  Hold Biotin, B vitamins, B Complex for 2-3 days before any blood draw (this can cause false thyroid function tests)  During the 12 hour FASTING time, you may have BLACK coffee, BLACK tea and/or water at any time.    We will contact you with the results of your blood work (once they have ALL finalized & Dr Jackson has reviewed them) and let you know of any necessary adjustments need to be done to your plan of care.  If you do not hear from us within 3-5 days business days of having your blood drawn, please call the [place] office at [phone].     The two nearest Outpatient Lab's to THIS office is:  [facility] (this is the Urgent Care building by [employer])  [address] [place] [address]  [phone]    Hours:   Monday through Friday 7:30am - 4pm (they usually close @ 12:30-1pm for lunch)     Or you can go to ...  [facility]  [address]  [place] [address] [phone]    Hours:   Monday through Friday 7am - 4:30pm (Closed 12:30-1pm for lunch)   Saturday 8am - 12pm     Click the blue link to take you to the website to confirm hours/location Locations of  lab facilities  The  [employer]  [employer] offers affordable laboratory tests.   Financial assistance is also available to those who meet financial eligibility requirements by submitting an [facility] Application or calling, [phone].   Click this link to Get a Price Estimate Today on what lab work may cost you.

## 2024-11-20 ENCOUNTER — LAB (OUTPATIENT)
Dept: LAB | Facility: LAB | Age: 74
End: 2024-11-20
Payer: COMMERCIAL

## 2024-11-20 ENCOUNTER — TELEPHONE (OUTPATIENT)
Dept: PRIMARY CARE | Facility: CLINIC | Age: 74
End: 2024-11-20

## 2024-11-20 ENCOUNTER — HOSPITAL ENCOUNTER (OUTPATIENT)
Dept: RADIOLOGY | Facility: HOSPITAL | Age: 74
Discharge: HOME | End: 2024-11-20
Payer: MEDICARE

## 2024-11-20 DIAGNOSIS — M54.2 NECK PAIN: ICD-10-CM

## 2024-11-20 DIAGNOSIS — Z13.220 LIPID SCREENING: ICD-10-CM

## 2024-11-20 DIAGNOSIS — N18.31 STAGE 3A CHRONIC KIDNEY DISEASE (MULTI): ICD-10-CM

## 2024-11-20 DIAGNOSIS — R25.1 TREMOR, UNSPECIFIED: ICD-10-CM

## 2024-11-20 DIAGNOSIS — Z00.00 ENCOUNTER FOR ANNUAL WELLNESS VISIT (AWV) IN MEDICARE PATIENT: ICD-10-CM

## 2024-11-20 DIAGNOSIS — D63.8 ANEMIA OF CHRONIC DISEASE: ICD-10-CM

## 2024-11-20 LAB
25(OH)D3 SERPL-MCNC: 63 NG/ML (ref 30–100)
ALBUMIN SERPL BCP-MCNC: 4.5 G/DL (ref 3.4–5)
ALP SERPL-CCNC: 40 U/L (ref 33–136)
ALT SERPL W P-5'-P-CCNC: 9 U/L (ref 10–52)
ANION GAP SERPL CALC-SCNC: 10 MMOL/L (ref 10–20)
AST SERPL W P-5'-P-CCNC: 14 U/L (ref 9–39)
BASOPHILS # BLD AUTO: 0.03 X10*3/UL (ref 0–0.1)
BASOPHILS NFR BLD AUTO: 0.6 %
BILIRUB SERPL-MCNC: 0.4 MG/DL (ref 0–1.2)
BUN SERPL-MCNC: 39 MG/DL (ref 6–23)
CALCIUM SERPL-MCNC: 9.6 MG/DL (ref 8.6–10.3)
CHLORIDE SERPL-SCNC: 106 MMOL/L (ref 98–107)
CHOLEST SERPL-MCNC: 204 MG/DL (ref 0–199)
CHOLESTEROL/HDL RATIO: 3.9
CO2 SERPL-SCNC: 30 MMOL/L (ref 21–32)
CREAT SERPL-MCNC: 1.49 MG/DL (ref 0.5–1.3)
EGFRCR SERPLBLD CKD-EPI 2021: 50 ML/MIN/1.73M*2
EOSINOPHIL # BLD AUTO: 0.14 X10*3/UL (ref 0–0.4)
EOSINOPHIL NFR BLD AUTO: 3 %
ERYTHROCYTE [DISTWIDTH] IN BLOOD BY AUTOMATED COUNT: 13.7 % (ref 11.5–14.5)
FERRITIN SERPL-MCNC: 172 NG/ML (ref 20–300)
GLUCOSE SERPL-MCNC: 95 MG/DL (ref 74–99)
HCT VFR BLD AUTO: 37.6 % (ref 41–52)
HDLC SERPL-MCNC: 52.1 MG/DL
HGB BLD-MCNC: 12.4 G/DL (ref 13.5–17.5)
IMM GRANULOCYTES # BLD AUTO: 0.01 X10*3/UL (ref 0–0.5)
IMM GRANULOCYTES NFR BLD AUTO: 0.2 % (ref 0–0.9)
IRON SATN MFR SERPL: 21 % (ref 25–45)
IRON SERPL-MCNC: 62 UG/DL (ref 35–150)
LDLC SERPL CALC-MCNC: 139 MG/DL
LYMPHOCYTES # BLD AUTO: 1.42 X10*3/UL (ref 0.8–3)
LYMPHOCYTES NFR BLD AUTO: 30.2 %
MCH RBC QN AUTO: 32.5 PG (ref 26–34)
MCHC RBC AUTO-ENTMCNC: 33 G/DL (ref 32–36)
MCV RBC AUTO: 99 FL (ref 80–100)
MONOCYTES # BLD AUTO: 0.63 X10*3/UL (ref 0.05–0.8)
MONOCYTES NFR BLD AUTO: 13.4 %
NEUTROPHILS # BLD AUTO: 2.47 X10*3/UL (ref 1.6–5.5)
NEUTROPHILS NFR BLD AUTO: 52.6 %
NON HDL CHOLESTEROL: 152 MG/DL (ref 0–149)
NRBC BLD-RTO: 0 /100 WBCS (ref 0–0)
PLATELET # BLD AUTO: 233 X10*3/UL (ref 150–450)
POTASSIUM SERPL-SCNC: 4.5 MMOL/L (ref 3.5–5.3)
PROT SERPL-MCNC: 6.3 G/DL (ref 6.4–8.2)
RBC # BLD AUTO: 3.81 X10*6/UL (ref 4.5–5.9)
SODIUM SERPL-SCNC: 141 MMOL/L (ref 136–145)
TIBC SERPL-MCNC: 299 UG/DL (ref 240–445)
TRIGL SERPL-MCNC: 63 MG/DL (ref 0–149)
TSH SERPL-ACNC: 2.74 MIU/L (ref 0.44–3.98)
UIBC SERPL-MCNC: 237 UG/DL (ref 110–370)
VIT B12 SERPL-MCNC: 464 PG/ML (ref 211–911)
VLDL: 13 MG/DL (ref 0–40)
WBC # BLD AUTO: 4.7 X10*3/UL (ref 4.4–11.3)

## 2024-11-20 PROCEDURE — 72050 X-RAY EXAM NECK SPINE 4/5VWS: CPT

## 2024-11-20 PROCEDURE — 82306 VITAMIN D 25 HYDROXY: CPT

## 2024-11-20 PROCEDURE — 72050 X-RAY EXAM NECK SPINE 4/5VWS: CPT | Performed by: RADIOLOGY

## 2024-11-20 PROCEDURE — 82607 VITAMIN B-12: CPT

## 2024-11-20 ASSESSMENT — ENCOUNTER SYMPTOMS
ARTHRALGIAS: 1
GASTROINTESTINAL NEGATIVE: 1
EYES NEGATIVE: 1
CARDIOVASCULAR NEGATIVE: 1
MYALGIAS: 1
NECK PAIN: 1
NEUROLOGICAL NEGATIVE: 1
PSYCHIATRIC NEGATIVE: 1
ENDOCRINE NEGATIVE: 1
CONSTITUTIONAL NEGATIVE: 1
RESPIRATORY NEGATIVE: 1

## 2024-11-22 DIAGNOSIS — E78.00 HYPERCHOLESTEREMIA: Primary | ICD-10-CM

## 2024-11-22 DIAGNOSIS — M47.9 SPONDYLOSIS: ICD-10-CM

## 2024-11-22 RX ORDER — ROSUVASTATIN CALCIUM 10 MG/1
10 TABLET, COATED ORAL DAILY
Qty: 100 TABLET | Refills: 0 | Status: SHIPPED | OUTPATIENT
Start: 2024-11-22 | End: 2025-12-27

## 2024-12-20 DIAGNOSIS — Z12.11 SCREEN FOR COLON CANCER: ICD-10-CM

## 2024-12-20 RX ORDER — SODIUM PICOSULFATE, MAGNESIUM OXIDE, AND ANHYDROUS CITRIC ACID 12; 3.5; 1 G/175ML; G/175ML; MG/175ML
2 LIQUID ORAL AS NEEDED
Qty: 350 ML | Refills: 0 | Status: SHIPPED | OUTPATIENT
Start: 2024-12-20

## 2024-12-31 ENCOUNTER — PHARMACY VISIT (OUTPATIENT)
Dept: PHARMACY | Facility: CLINIC | Age: 74
End: 2024-12-31
Payer: COMMERCIAL

## 2024-12-31 PROCEDURE — RXMED WILLOW AMBULATORY MEDICATION CHARGE

## 2025-02-17 DIAGNOSIS — E78.00 HYPERCHOLESTEREMIA: ICD-10-CM

## 2025-02-17 RX ORDER — ROSUVASTATIN CALCIUM 10 MG/1
10 TABLET, COATED ORAL DAILY
Qty: 100 TABLET | Refills: 0 | Status: SHIPPED | OUTPATIENT
Start: 2025-02-17 | End: 2026-03-24

## 2025-03-27 ENCOUNTER — ANESTHESIA EVENT (OUTPATIENT)
Dept: GASTROENTEROLOGY | Facility: HOSPITAL | Age: 75
End: 2025-03-27
Payer: MEDICARE

## 2025-03-27 ENCOUNTER — APPOINTMENT (OUTPATIENT)
Dept: PREADMISSION TESTING | Facility: HOSPITAL | Age: 75
End: 2025-03-27
Payer: MEDICARE

## 2025-03-27 VITALS — WEIGHT: 175 LBS | BODY MASS INDEX: 25.92 KG/M2 | HEIGHT: 69 IN

## 2025-03-27 ASSESSMENT — DUKE ACTIVITY SCORE INDEX (DASI)
CAN YOU WALK A BLOCK OR TWO ON LEVEL GROUND: YES
CAN YOU PARTICIPATE IN MODERATE RECREATIONAL ACTIVITIES LIKE GOLF, BOWLING, DANCING, DOUBLES TENNIS OR THROWING A BASEBALL OR FOOTBALL: NO
CAN YOU PARTICIPATE IN STRENOUS SPORTS LIKE SWIMMING, SINGLES TENNIS, FOOTBALL, BASKETBALL, OR SKIING: NO
CAN YOU RUN A SHORT DISTANCE: NO
CAN YOU TAKE CARE OF YOURSELF (EAT, DRESS, BATHE, OR USE TOILET): YES
CAN YOU CLIMB A FLIGHT OF STAIRS OR WALK UP A HILL: YES
CAN YOU DO HEAVY WORK AROUND THE HOUSE LIKE SCRUBBING FLOORS OR LIFTING AND MOVING HEAVY FURNITURE: YES
CAN YOU DO MODERATE WORK AROUND THE HOUSE LIKE VACUUMING, SWEEPING FLOORS OR CARRYING GROCERIES: YES
CAN YOU WALK INDOORS, SUCH AS AROUND YOUR HOUSE: YES
CAN YOU DO YARD WORK LIKE RAKING LEAVES, WEEDING OR PUSHING A MOWER: YES
CAN YOU DO LIGHT WORK AROUND THE HOUSE LIKE DUSTING OR WASHING DISHES: YES

## 2025-03-27 NOTE — PREPROCEDURE INSTRUCTIONS
Medication List            Accurate as of March 27, 2025 11:21 AM. Always use your most recent med list.                acetaminophen 500 mg tablet  Commonly known as: Tylenol  Medication Adjustments for Surgery: Take/Use as prescribed     ascorbic acid 1,000 mg tablet  Commonly known as: Vitamin C  Medication Adjustments for Surgery: Take last dose 2 days before surgery     calcium carbonate 500 mg calcium (1,250 mg) tablet  Commonly known as: Oscal  Medication Adjustments for Surgery: Take last dose 2 days before surgery     cholecalciferol 50 mcg (2,000 unit) capsule  Commonly known as: Vitamin D-3  Medication Adjustments for Surgery: Take last dose 2 days before surgery     dapagliflozin propanediol 5 mg  Commonly known as: Farxiga  Take 1 tablet (5 mg) by mouth once daily.  Medication Adjustments for Surgery: Take last dose 3 days before surgery     propranolol 20 mg tablet  Commonly known as: Inderal  TAKE 1 TABLET TWICE A DAY  Medication Adjustments for Surgery: Take on the morning of surgery     rosuvastatin 10 mg tablet  Commonly known as: Crestor  Take 1 tablet (10 mg) by mouth once daily.  Medication Adjustments for Surgery: Take last dose 1 day (24 hours) before surgery     sodium,potassium,mag sulfates 17.5-3.13-1.6 gram solution  Commonly known as: Suprep Bowel Prep Kit  Take 2 bottles by mouth if needed (Kit prep take one bottle at 6pm night before procedure and take second bottle at 4 am day of procedure).  Medication Adjustments for Surgery: Take/Use as prescribed              Pt not taking Farxiga- if he starts-Please hold 3 days prior to procedure.                 NPO Instructions:    Follow instructions. Day before procedure-may have light breakfast by 9am (ex. 1 egg, 1 piece of toast).  Then CLEAR LIQUIDS ONLY-No dairy products, nothing red/purple.  Take first part of prep- Evening Before Procedure.  Drink lots of liquids.  Day of Procedure- 3-4am Take Second Part of Prep.  After Midnight the  only Clear Liquid allowed is: Water, Black Coffee, Tea, Gatorade and Clear Soda.  STOP DRINKING 3 HOURS PRIOR TO PROCEDURE.  Take medications as instructed.   No Gum, Candy, Mints or Smoking day of the procedure!     Additional Instructions:     Review your medication instructions, take indicated medications  Wear  comfortable loose fitting clothing  All jewelry and valuables should be left at home    Park in back of hospital by ER. Come up to Second floor-Outpt dept to check in.  Bring Photo ID and Insurance card,   You MUST have a  with you.  No more than 2 visitors with you please.  There is construction around the hospital- best to take [address] [facility] to [facility].     If you get ill at all before your procedure- CALL YOUR DOCTOR/SURGEON.  We want you in the best shape that is possible. Any sickness might lead to your procedure being delayed.      Call Outpatient dept at [phone] the night before your procedure (Friday for Monday procedure), between 1-3 pm.

## 2025-03-27 NOTE — ANESTHESIA PREPROCEDURE EVALUATION
Patient: Kashif Jung    Procedure Information       Date/Time: 04/17/25 0900    Scheduled providers: Jonathan Lomas MD    Procedure: COLONOSCOPY    Location: [facility]            Relevant Problems   Anesthesia (within normal limits)      Cardiac   (+) Hypertension      Pulmonary (within normal limits)      Neuro   (+) Anxiety      GI   (+) GERD without esophagitis      /Renal   (+) Benign prostatic hyperplasia without lower urinary tract symptoms      Liver (within normal limits)      Endocrine (within normal limits)      Hematology   (+) Anemia of chronic disease      Musculoskeletal (within normal limits)      HEENT (within normal limits)      ID (within normal limits)      Skin (within normal limits)      GYN (within normal limits)       Clinical information reviewed:               There were no vitals filed for this visit.    Past Surgical History:   Procedure Laterality Date    SHOULDER SURGERY  09/10/2014    Shoulder Surgery     Past Medical History:   Diagnosis Date    Acute bronchitis 05/25/2023    BPH (benign prostatic hyperplasia)     Complete tear of right rotator cuff 05/25/2023    Cough 05/25/2023    Dysfunction of left eustachian tube 05/25/2023    Elevated LDL cholesterol level 05/25/2023    Fatigue 05/25/2023    Fever 05/25/2023    Hypertension     Other conditions influencing health status     No significant past medical history    Pruritus ani 05/25/2023    Right shoulder pain 05/25/2023    Tinnitus, bilateral 05/25/2023    URI (upper respiratory infection) 05/25/2023       Current Outpatient Medications:     acetaminophen (Tylenol) 500 mg tablet, Take 1 tablet (500 mg) by mouth every 6 hours if needed for mild pain (1 - 3)., Disp: , Rfl:     ascorbic acid (Vitamin C) 1,000 mg tablet, Take 1 tablet (1,000 mg) by mouth once daily., Disp: , Rfl:     calcium 500 mg calcium (1,250 mg) tablet, Take 1 tablet (1,250 mg) by mouth once daily., Disp: , Rfl:     cholecalciferol (Vitamin  D-3) 50 mcg (2,000 unit) capsule, Take 1 capsule (50 mcg) by mouth early in the morning.., Disp: , Rfl:     sodium,potassium,mag sulfates (Suprep Bowel Prep Kit) 17.5-3.13-1.6 gram solution, Take 2 bottles by mouth if needed (Kit prep take one bottle at 6pm night before procedure and take second bottle at 4 am day of procedure)., Disp: 354 mL, Rfl: 0    dapagliflozin propanediol (Farxiga) 5 mg, Take 1 tablet (5 mg) by mouth once daily., Disp: 90 tablet, Rfl: 1    propranolol (Inderal) 20 mg tablet, TAKE 1 TABLET TWICE A DAY, Disp: 180 tablet, Rfl: 3    rosuvastatin (Crestor) 10 mg tablet, Take 1 tablet (10 mg) by mouth once daily., Disp: 100 tablet, Rfl: 0  Prior to Admission medications    Medication Sig Start Date End Date Taking? Authorizing Provider   acetaminophen (Tylenol) 500 mg tablet Take 1 tablet (500 mg) by mouth every 6 hours if needed for mild pain (1 - 3).    Historical Provider, MD   ascorbic acid (Vitamin C) 1,000 mg tablet Take 1 tablet (1,000 mg) by mouth once daily. 3/3/20   Historical Provider, MD   calcium 500 mg calcium (1,250 mg) tablet Take 1 tablet (1,250 mg) by mouth once daily. 3/3/20   Historical Provider, MD   cholecalciferol (Vitamin D-3) 50 mcg (2,000 unit) capsule Take 1 capsule (50 mcg) by mouth early in the morning.. 3/3/20   Historical Provider, MD   sodium,potassium,mag sulfates (Suprep Bowel Prep Kit) 17.5-3.13-1.6 gram solution Take 2 bottles by mouth if needed (Kit prep take one bottle at 6pm night before procedure and take second bottle at 4 am day of procedure). 12/20/24   Jonathan Lomas MD   dapagliflozin propanediol (Farxiga) 5 mg Take 1 tablet (5 mg) by mouth once daily. 11/19/24   Wilbert Palomares MD   propranolol (Inderal) 20 mg tablet TAKE 1 TABLET TWICE A DAY 9/9/24   Wilbert Palomares MD   rosuvastatin (Crestor) 10 mg tablet Take 1 tablet (10 mg) by mouth once daily. 2/17/25 3/24/26  Wilbert Palomares MD     Allergies   Allergen Reactions    Codeine Unknown     "Penicillins Rash     Social History     Tobacco Use    Smoking status: Former     Current packs/day: 0.00     Types: Cigarettes     Quit date:      Years since quittin.2    Smokeless tobacco: Never   Substance Use Topics    Alcohol use: Yes     Comment: rare         Chemistry    Lab Results   Component Value Date/Time     2024 0807    K 4.5 2024 0807     2024 0807    CO2 30 2024 0807    BUN 39 (H) 2024 0807    CREATININE 1.49 (H) 2024 08    Lab Results   Component Value Date/Time    CALCIUM 9.6 2024 0807    ALKPHOS 40 2024 0807    AST 14 2024 0807    ALT 9 (L) 2024 0807    BILITOT 0.4 2024 0807          Lab Results   Component Value Date/Time    WBC 4.7 2024 0807    HGB 12.4 (L) 2024 0807    HCT 37.6 (L) 2024 0807     2024 0807     No results found for: \"PROTIME\", \"PTT\", \"INR\"  No results found for this or any previous visit (from the past 4464 hours).  No results found for this or any previous visit from the past 1095 days.     Chart Reviewed. No clearances ordered.     NPO Detail:  No data recorded     Physical Exam    Airway  Mallampati: II     Cardiovascular - normal exam   Dental    Pulmonary - normal exam   Abdominal - normal exam           Anesthesia Plan    History of general anesthesia?: yes  History of complications of general anesthesia?: no    ASA 3     MAC     The patient is not a current smoker.  Patient did not smoke on day of procedure.    intravenous induction   Anesthetic plan and risks discussed with patient.      "

## 2025-04-17 ENCOUNTER — HOSPITAL ENCOUNTER (OUTPATIENT)
Dept: GASTROENTEROLOGY | Facility: HOSPITAL | Age: 75
Discharge: HOME | End: 2025-04-17
Payer: MEDICARE

## 2025-04-17 ENCOUNTER — ANESTHESIA (OUTPATIENT)
Dept: GASTROENTEROLOGY | Facility: HOSPITAL | Age: 75
End: 2025-04-17
Payer: MEDICARE

## 2025-04-17 VITALS
DIASTOLIC BLOOD PRESSURE: 79 MMHG | WEIGHT: 175 LBS | HEART RATE: 63 BPM | OXYGEN SATURATION: 99 % | BODY MASS INDEX: 25.92 KG/M2 | RESPIRATION RATE: 17 BRPM | HEIGHT: 69 IN | TEMPERATURE: 97.3 F | SYSTOLIC BLOOD PRESSURE: 97 MMHG

## 2025-04-17 DIAGNOSIS — Z12.11 ENCOUNTER FOR SCREENING FOR MALIGNANT NEOPLASM OF COLON: ICD-10-CM

## 2025-04-17 PROCEDURE — 45380 COLONOSCOPY AND BIOPSY: CPT | Performed by: SURGERY

## 2025-04-17 PROCEDURE — 3700000001 HC GENERAL ANESTHESIA TIME - INITIAL BASE CHARGE

## 2025-04-17 PROCEDURE — 2500000004 HC RX 250 GENERAL PHARMACY W/ HCPCS (ALT 636 FOR OP/ED): Mod: JZ | Performed by: NURSE ANESTHETIST, CERTIFIED REGISTERED

## 2025-04-17 PROCEDURE — 7100000010 HC PHASE TWO TIME - EACH INCREMENTAL 1 MINUTE

## 2025-04-17 PROCEDURE — 7100000009 HC PHASE TWO TIME - INITIAL BASE CHARGE

## 2025-04-17 PROCEDURE — 3700000002 HC GENERAL ANESTHESIA TIME - EACH INCREMENTAL 1 MINUTE

## 2025-04-17 RX ORDER — PROPOFOL 10 MG/ML
INJECTION, EMULSION INTRAVENOUS AS NEEDED
Status: DISCONTINUED | OUTPATIENT
Start: 2025-04-17 | End: 2025-04-17

## 2025-04-17 RX ORDER — LIDOCAINE HYDROCHLORIDE 20 MG/ML
INJECTION, SOLUTION EPIDURAL; INFILTRATION; INTRACAUDAL; PERINEURAL AS NEEDED
Status: DISCONTINUED | OUTPATIENT
Start: 2025-04-17 | End: 2025-04-17

## 2025-04-17 RX ORDER — SODIUM CHLORIDE, SODIUM LACTATE, POTASSIUM CHLORIDE, CALCIUM CHLORIDE 600; 310; 30; 20 MG/100ML; MG/100ML; MG/100ML; MG/100ML
30 INJECTION, SOLUTION INTRAVENOUS CONTINUOUS
Status: ACTIVE | OUTPATIENT
Start: 2025-04-17 | End: 2025-04-17

## 2025-04-17 RX ADMIN — PROPOFOL 20 MG: 10 INJECTION, EMULSION INTRAVENOUS at 10:01

## 2025-04-17 RX ADMIN — PROPOFOL 80 MG: 10 INJECTION, EMULSION INTRAVENOUS at 09:49

## 2025-04-17 RX ADMIN — PROPOFOL 20 MG: 10 INJECTION, EMULSION INTRAVENOUS at 09:51

## 2025-04-17 RX ADMIN — PROPOFOL 20 MG: 10 INJECTION, EMULSION INTRAVENOUS at 09:59

## 2025-04-17 RX ADMIN — PROPOFOL 20 MG: 10 INJECTION, EMULSION INTRAVENOUS at 09:57

## 2025-04-17 RX ADMIN — PROPOFOL 20 MG: 10 INJECTION, EMULSION INTRAVENOUS at 09:55

## 2025-04-17 RX ADMIN — PROPOFOL 20 MG: 10 INJECTION, EMULSION INTRAVENOUS at 09:53

## 2025-04-17 RX ADMIN — LIDOCAINE HYDROCHLORIDE 100 MG: 20 INJECTION, SOLUTION EPIDURAL; INFILTRATION; INTRACAUDAL; PERINEURAL at 09:49

## 2025-04-17 SDOH — HEALTH STABILITY: MENTAL HEALTH: CURRENT SMOKER: 0

## 2025-04-17 ASSESSMENT — PAIN - FUNCTIONAL ASSESSMENT
PAIN_FUNCTIONAL_ASSESSMENT: 0-10

## 2025-04-17 ASSESSMENT — PAIN SCALES - GENERAL
PAINLEVEL_OUTOF10: 0 - NO PAIN
PAIN_LEVEL: 0
PAINLEVEL_OUTOF10: 0 - NO PAIN

## 2025-04-17 ASSESSMENT — COLUMBIA-SUICIDE SEVERITY RATING SCALE - C-SSRS
1. IN THE PAST MONTH, HAVE YOU WISHED YOU WERE DEAD OR WISHED YOU COULD GO TO SLEEP AND NOT WAKE UP?: NO
6. HAVE YOU EVER DONE ANYTHING, STARTED TO DO ANYTHING, OR PREPARED TO DO ANYTHING TO END YOUR LIFE?: NO
2. HAVE YOU ACTUALLY HAD ANY THOUGHTS OF KILLING YOURSELF?: NO

## 2025-04-17 NOTE — ANESTHESIA POSTPROCEDURE EVALUATION
Patient: Kashif Jung    Procedure Summary       Date: 04/17/25 Room / Location: [facility]    Anesthesia Start: 0945 Anesthesia Stop: 1008    Procedure: COLONOSCOPY Diagnosis: Encounter for screening for malignant neoplasm of colon    Scheduled Providers: Jonathan Lomas MD Responsible Provider: DASHAWN Beckford    Anesthesia Type: MAC ASA Status: 3            Anesthesia Type: MAC    Vitals Value Taken Time   BP 99/66 04/17/25 10:07   Temp 36.3 °C (97.3 °F) 04/17/25 10:07   Pulse 65 04/17/25 10:07   Resp 17 04/17/25 10:07   SpO2 98 % 04/17/25 10:07       Anesthesia Post Evaluation    Patient location during evaluation: PACU  Patient participation: complete - patient participated  Level of consciousness: sleepy but conscious  Pain score: 0  Pain management: adequate  Airway patency: patent  Two or more strategies used to mitigate risk of obstructive sleep apnea  Cardiovascular status: acceptable  Respiratory status: acceptable  Hydration status: acceptable  Postoperative Nausea and Vomiting: none        There were no known notable events for this encounter.

## 2025-04-17 NOTE — H&P
"History Of Present Illness  Kashif Jung is a 74 y.o. male presenting for a colonoscopy      Past Medical History  Medical History[1]    Surgical History  Surgical History[2]     Social History  He reports that he quit smoking about 37 years ago. His smoking use included cigarettes. He started smoking about 55 years ago. He has a 18 pack-year smoking history. He has never used smokeless tobacco. He reports current alcohol use. He reports that he does not use drugs.    Family History  Family History[3]     Allergies  Codeine and Penicillins    Review of Systems   All other systems reviewed and are negative.       Physical Exam  Vitals reviewed.   Constitutional:       Appearance: Normal appearance.   HENT:      Head: Normocephalic.   Cardiovascular:      Rate and Rhythm: Normal rate and regular rhythm.      Heart sounds: Normal heart sounds.   Pulmonary:      Effort: Pulmonary effort is normal.      Breath sounds: Normal breath sounds.   Abdominal:      General: Abdomen is flat. There is no distension.      Palpations: Abdomen is soft. There is no mass.      Tenderness: There is no abdominal tenderness. There is no guarding.   Musculoskeletal:         General: Normal range of motion.   Skin:     General: Skin is warm.   Neurological:      General: No focal deficit present.   Psychiatric:         Mood and Affect: Mood normal.          Last Recorded Vitals  Blood pressure 143/77, pulse 64, temperature 36.3 °C (97.3 °F), temperature source Temporal, resp. rate 17, height 1.753 m (5' 9\"), weight 79.4 kg (175 lb), SpO2 98%.         Assessment & Plan  Encounter for screening for malignant neoplasm of colon      COLONOSCOPY/BIOPSIES.  Risks include, but not limited to pain, infection, bleeding, perforation, missed lesions, aspiration, risk of cardiac, pulmonary, neurologic, locomotor, anesthetic events, incomplete colonoscopy, and other unforeseen complications including death      Jonathan Lomas MD         [1]   Past " Medical History:  Diagnosis Date    Acute bronchitis 05/25/2023    BPH (benign prostatic hyperplasia)     Complete tear of right rotator cuff 05/25/2023    Cough 05/25/2023    Dysfunction of left eustachian tube 05/25/2023    Elevated LDL cholesterol level 05/25/2023    Fatigue 05/25/2023    Fever 05/25/2023    Hyperlipidemia     Hypertension     Other conditions influencing health status     No significant past medical history    Pruritus ani 05/25/2023    Right shoulder pain 05/25/2023    Tinnitus, bilateral 05/25/2023    URI (upper respiratory infection) 05/25/2023   [2]   Past Surgical History:  Procedure Laterality Date    CATARACT EXTRACTION, BILATERAL      SHOULDER SURGERY Bilateral 09/10/2014    Torn ligament repair    TONSILLECTOMY     [3] No family history on file.

## 2025-04-17 NOTE — DISCHARGE INSTRUCTIONS
Patient Instructions after a Colonoscopy      The anesthetics, sedatives or narcotics which were given to you today will be acting in your body for the next 24 hours, so you might feel a little sleepy or groggy.  This feeling should slowly wear off. Carefully read and follow the instructions.     You received sedation today:  - Do not drive or operate any machinery or power tools of any kind.   - No alcoholic beverages today, not even beer or wine.  - Do not make any important decisions or sign any legal documents.  - No over the counter medications that contain alcohol or that may cause drowsiness.  - Do not make any important decisions or sign any legal documents.  - Make sure you have someone with you for first 24 hours.    While it is common to experience mild to moderate abdominal distention, gas, or belching after your procedure, if any of these symptoms occur following discharge from the GI Lab or within one week of having your procedure, call the [facility] to be advised whether a visit to your nearest Urgent Care or Emergency Department is indicated.  Take this paper with you if you go.     - If you develop an allergic reaction to the medications that were given during your procedure such as difficulty breathing, rash, hives, severe nausea, vomiting or lightheadedness.  - If you experience chest pain, shortness of breath, severe abdominal pain, fevers and chills.  -If you develop signs and symptoms of bleeding such as blood in your spit, if your stools turn black, tarry, or bloody  - If you have not urinated within 8 hours following your procedure.  - If your IV site becomes painful, red, inflamed, or looks infected.    If you received a biopsy/polypectomy/sphincterotomy the following instructions apply below:    __ Do not use Aspirin containing products, non-steroidal medications or anti-coagulants for one week following your procedure. (Examples of these types of medications are: Advil,  Arthrotec, Aleve, Coumadin, Ecotrin, Heparin, Ibuprofen, Indocin, Motrin, Naprosyn, Nuprin, Plavix, Vioxx, and Voltarin, or their generic forms.  This list is not all-inclusive.  Check with your physician or pharmacist before resuming medications.)   __ Eat a soft diet today.  Avoid foods that are poorly digested for the next 24 hours.  These foods would include: nuts, beans, lettuce, red meats, and fried foods. Start with liquids and advance your diet as tolerated, gradually work up to eating solids.   __ Do not have a Barium Study or Enema for one week.    Your physician recommends the additional following instructions:    -You have a contact number available for emergencies. The signs and symptoms of potential delayed complications were discussed with you. You may return to normal activities tomorrow.  -Resume your previous diet.  -Continue your present medications.   -We are waiting for your pathology results.  -Your physician has recommended a repeat colonoscopy (date to be determined after pending pathology results are reviewed) for surveillance based on pathology results.  -The findings and recommendations have been discussed with you.  -The findings and recommendations were discussed with your family.  - Please see Medication Reconciliation Form for new medication/medications prescribed.       If you experience any problems or have any questions following discharge from the GI Lab, please call:        Nurse Signature                                                                        Date___________________                                                                            Patient/Responsible Party Signature                                        Date___________________

## 2025-04-18 ASSESSMENT — PAIN SCALES - GENERAL: PAINLEVEL_OUTOF10: 0 - NO PAIN

## 2025-04-29 ENCOUNTER — TELEPHONE (OUTPATIENT)
Dept: SURGERY | Facility: CLINIC | Age: 75
End: 2025-04-29
Payer: COMMERCIAL

## 2025-04-29 LAB
LABORATORY COMMENT REPORT: NORMAL
PATH REPORT.FINAL DX SPEC: NORMAL
PATH REPORT.GROSS SPEC: NORMAL
PATH REPORT.RELEVANT HX SPEC: NORMAL
PATH REPORT.TOTAL CANCER: NORMAL

## 2025-04-29 NOTE — TELEPHONE ENCOUNTER
----- Message from Jonathan Lomas sent at 4/29/2025  3:35 PM EDT -----  Let patient know polyp was removed and nothing to worry about.  No further colonoscopies necessary  ----- Message -----  From: Lab, Background User  Sent: 4/29/2025   2:38 PM EDT  To: Jonathan Lomas MD

## 2025-05-16 DIAGNOSIS — E78.00 HYPERCHOLESTEREMIA: ICD-10-CM

## 2025-05-16 RX ORDER — ROSUVASTATIN CALCIUM 10 MG/1
10 TABLET, COATED ORAL DAILY
Qty: 90 TABLET | Refills: 1 | Status: SHIPPED | OUTPATIENT
Start: 2025-05-16

## 2025-09-02 DIAGNOSIS — G25.2 INTENTION TREMOR: ICD-10-CM

## 2025-09-02 DIAGNOSIS — I10 HYPERTENSION, UNSPECIFIED TYPE: ICD-10-CM

## 2025-09-02 RX ORDER — PROPRANOLOL HYDROCHLORIDE 20 MG/1
20 TABLET ORAL 2 TIMES DAILY
Qty: 180 TABLET | Refills: 0 | Status: SHIPPED | OUTPATIENT
Start: 2025-09-02

## 2025-11-19 ENCOUNTER — APPOINTMENT (OUTPATIENT)
Dept: PRIMARY CARE | Facility: CLINIC | Age: 75
End: 2025-11-19
Payer: COMMERCIAL